# Patient Record
Sex: MALE | Race: WHITE | NOT HISPANIC OR LATINO | Employment: UNEMPLOYED | URBAN - METROPOLITAN AREA
[De-identification: names, ages, dates, MRNs, and addresses within clinical notes are randomized per-mention and may not be internally consistent; named-entity substitution may affect disease eponyms.]

---
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
## 2019-03-09 ENCOUNTER — OFFICE VISIT (OUTPATIENT)
Dept: URGENT CARE | Facility: CLINIC | Age: 19
End: 2019-03-09
Payer: COMMERCIAL

## 2019-03-09 VITALS
RESPIRATION RATE: 16 BRPM | HEART RATE: 90 BPM | WEIGHT: 190 LBS | TEMPERATURE: 98.7 F | SYSTOLIC BLOOD PRESSURE: 127 MMHG | DIASTOLIC BLOOD PRESSURE: 78 MMHG | BODY MASS INDEX: 29.82 KG/M2 | HEIGHT: 67 IN | OXYGEN SATURATION: 99 %

## 2019-03-09 DIAGNOSIS — L30.9 HAND DERMATITIS: Primary | ICD-10-CM

## 2019-03-09 PROCEDURE — 99213 OFFICE O/P EST LOW 20 MIN: CPT | Performed by: PHYSICIAN ASSISTANT

## 2019-03-09 PROCEDURE — S9088 SERVICES PROVIDED IN URGENT: HCPCS | Performed by: PHYSICIAN ASSISTANT

## 2019-03-09 RX ORDER — METHYLPREDNISOLONE 4 MG/1
TABLET ORAL
Qty: 21 TABLET | Refills: 0 | Status: SHIPPED | OUTPATIENT
Start: 2019-03-09

## 2019-03-09 RX ORDER — TRIAMCINOLONE ACETONIDE 1 MG/G
CREAM TOPICAL 3 TIMES DAILY
Qty: 45 G | Refills: 0 | Status: SHIPPED | OUTPATIENT
Start: 2019-03-09

## 2019-03-09 NOTE — PROGRESS NOTES
[facility]    NAME: Cricket Howell is a 22 y o  male  : 2000    MRN: [id number]  DATE: 2019  TIME: 11:53 AM    Assessment and Plan   Hand dermatitis [L30 9]  1  Hand dermatitis  triamcinolone (KENALOG) 0 1 % cream    methylprednisolone (MEDROL) 4 mg tablet       Patient Instructions     Patient Instructions   Start medrol dose elizabeth  Use cream as directed  Wear gloves  To prevent contact with /solutions  If not improving over the next week follow up with PCP  Chief Complaint     Chief Complaint   Patient presents with    Rash     hand, x 2 weeks       History of Present Illness   78-year-old male here with rash on both hands  States that he has been using  and solutions at work  Has been irritating his skin  Skin is dry, itchy and also painful at times  Has been trying to apply moisturizer but it is not helping enough  Review of Systems   Review of Systems   Constitutional: Negative for activity change, appetite change, chills, diaphoresis, fatigue, fever and unexpected weight change  HENT: Negative for congestion, ear pain, hearing loss, sinus pressure, sneezing, sore throat and tinnitus  Eyes: Negative for photophobia, redness and visual disturbance  Respiratory: Negative for apnea, cough, chest tightness, shortness of breath, wheezing and stridor  Cardiovascular: Negative for chest pain, palpitations and leg swelling  Gastrointestinal: Negative for abdominal distention, abdominal pain, blood in stool, constipation, diarrhea, nausea and vomiting  Endocrine: Negative for cold intolerance, heat intolerance, polydipsia, polyphagia and polyuria  Genitourinary: Negative for difficulty urinating, dysuria, flank pain, hematuria and urgency  Musculoskeletal: Negative for arthralgias, back pain, gait problem, myalgias, neck pain and neck stiffness  Skin: Positive for rash (Dermatitis both hands)  Negative for wound     Neurological: Negative for dizziness, tremors, seizures, syncope, weakness, light-headedness, numbness and headaches  Hematological: Negative for adenopathy  Does not bruise/bleed easily  Psychiatric/Behavioral: Negative for agitation, behavioral problems, confusion and decreased concentration  The patient is not nervous/anxious  All other systems reviewed and are negative  Current Medications     Current Outpatient Medications:     methylprednisolone (MEDROL) 4 mg tablet, Medrol dosepak, take as directed, Disp: 21 tablet, Rfl: 0    triamcinolone (KENALOG) 0 1 % cream, Apply topically 3 (three) times a day, Disp: 45 g, Rfl: 0    Current Allergies     Allergies as of 03/09/2019 - Reviewed 03/09/2019   Allergen Reaction Noted    Penicillins Vomiting 11/20/2018          The following portions of the patient's history were reviewed and updated as appropriate: allergies, current medications, past family history, past medical history, past social history, past surgical history and problem list    History reviewed  No pertinent past medical history  History reviewed  No pertinent surgical history  History reviewed  No pertinent family history    Social History     Socioeconomic History    Marital status: Single     Spouse name: Not on file    Number of children: Not on file    Years of education: Not on file    Highest education level: Not on file   Occupational History    Not on file   Social Needs    Financial resource strain: Not on file    Food insecurity:     Worry: Not on file     Inability: Not on file    Transportation needs:     Medical: Not on file     Non-medical: Not on file   Tobacco Use    Smoking status: Not on file   Substance and Sexual Activity    Alcohol use: Not on file    Drug use: Not on file    Sexual activity: Not on file   Lifestyle    Physical activity:     Days per week: Not on file     Minutes per session: Not on file    Stress: Not on file   Relationships    Social connections:     Talks on phone: Not on file     Gets together: Not on file     Attends L[employer] service: Not on file     Active member of club or organization: Not on file     Attends meetings of clubs or organizations: Not on file     Relationship status: Not on file    Intimate partner violence:     Fear of current or ex partner: Not on file     Emotionally abused: Not on file     Physically abused: Not on file     Forced sexual activity: Not on file   Other Topics Concern    Not on file   Social History Narrative    Not on file     Medications have been verified  Objective   /78   Pulse 90   Temp 98 7 °F (37 1 °C)   Resp 16   Ht 5' 7" (1 702 m)   Wt 86 2 kg (190 lb)   SpO2 99%   BMI 29 76 kg/m²      Physical Exam   Physical Exam   Constitutional: He appears well-developed and well-nourished  No distress  HENT:   Head: Normocephalic  Right Ear: External ear normal    Left Ear: External ear normal    Nose: Nose normal    Mouth/Throat: Oropharynx is clear and moist  No oropharyngeal exudate  Neck: Normal range of motion  Neck supple  Cardiovascular: Normal rate, regular rhythm and normal heart sounds  No murmur heard  Pulmonary/Chest: Effort normal and breath sounds normal  No respiratory distress  He has no wheezes  He has no rales  Abdominal: Soft  Bowel sounds are normal  There is no tenderness  Musculoskeletal: Normal range of motion  Lymphadenopathy:     He has no cervical adenopathy  Skin: Skin is warm  Rash (Erythematous dry rash bilateral palmar surface of each hand ) noted  Nursing note and vitals reviewed

## 2019-03-09 NOTE — PATIENT INSTRUCTIONS
Start medrol dose elizabeth  Use cream as directed  Wear gloves  To prevent contact with /solutions  If not improving over the next week follow up with PCP

## 2021-05-06 ENCOUNTER — OFFICE VISIT (OUTPATIENT)
Dept: FAMILY MEDICINE CLINIC | Facility: CLINIC | Age: 21
End: 2021-05-06
Payer: COMMERCIAL

## 2021-05-06 VITALS
OXYGEN SATURATION: 98 % | TEMPERATURE: 97.4 F | HEIGHT: 67 IN | DIASTOLIC BLOOD PRESSURE: 86 MMHG | BODY MASS INDEX: 31.55 KG/M2 | WEIGHT: 201 LBS | RESPIRATION RATE: 18 BRPM | HEART RATE: 96 BPM | SYSTOLIC BLOOD PRESSURE: 118 MMHG

## 2021-05-06 DIAGNOSIS — Z00.00 ANNUAL PHYSICAL EXAM: Primary | ICD-10-CM

## 2021-05-06 DIAGNOSIS — F17.200 TOBACCO USE DISORDER: ICD-10-CM

## 2021-05-06 DIAGNOSIS — F31.9 BIPOLAR 1 DISORDER (HCC): ICD-10-CM

## 2021-05-06 DIAGNOSIS — L30.8 OTHER ECZEMA: ICD-10-CM

## 2021-05-06 PROBLEM — L30.9 ECZEMA: Status: ACTIVE | Noted: 2021-05-06

## 2021-05-06 PROCEDURE — 99385 PREV VISIT NEW AGE 18-39: CPT | Performed by: PHYSICIAN ASSISTANT

## 2021-05-06 RX ORDER — HYDROXYZINE HYDROCHLORIDE 10 MG/1
10 TABLET, FILM COATED ORAL 3 TIMES DAILY PRN
COMMUNITY
Start: 2021-04-07

## 2021-05-06 RX ORDER — QUETIAPINE FUMARATE 400 MG/1
TABLET, FILM COATED ORAL
COMMUNITY
Start: 2021-05-03

## 2021-05-06 NOTE — PATIENT INSTRUCTIONS

## 2021-05-06 NOTE — PROGRESS NOTES
[facility]    NAME: Elvira Bang  AGE: 23 y o  SEX: male  : 2000     DATE: 2021     Assessment and Plan:     Problem List Items Addressed This Visit        Musculoskeletal and Integument    Eczema  Patient will call when needing refill of topical steroid       Other    Bipolar 1 disorder ([address] )  Managed by Psychiatry  Currently on Seroquel    Relevant Medications    QUEtiapine (SEROquel) 400 MG tablet    hydrOXYzine HCL (ATARAX) 10 mg tablet      Other Visit Diagnoses     Annual physical exam    -  Primary  Pt is a 23 y o  male  Vaccines: UTD, scheduled COVID vaccine in 2 days  Sexual Health: not sexually active  Family history: Mother - MI age 38 y/o, prior HTN, little known father side  - Labs: per orders    Relevant Orders    HIV 1/2 Antigen/Antibody (4th Generation) w Reflex [facility]    TSH, 3rd generation with Free T4 reflex    CBC and Platelet    Comprehensive metabolic panel    Lipid Panel with Direct LDL reflex    BMI 31 0-31 9,adult            Tobacco use disorder  Pt is in the Precontemplation stage (ie, not wanting to quit)  - Educate on the negative effects of Tobacco   - Recommend quitting smoking  - Listed cessation options           Immunizations and preventive care screenings were discussed with patient today  Appropriate education was printed on patient's after visit summary  Counseling:  Alcohol/drug use: discussed moderation in alcohol intake, the recommendations for healthy alcohol use, and avoidance of illicit drug use  Dental Health: discussed importance of regular tooth brushing, flossing, and dental visits  Sexual health: discussed sexually transmitted diseases, partner selection, use of condoms, avoidance of unintended pregnancy, and contraceptive alternatives  · Exercise: the importance of regular exercise/physical activity was discussed  Recommend exercise 3-5 times per week for at least 30 minutes  Tobacco Cessation Counseling: Tobacco cessation counseling was provided  The patient is sincerely urged to quit consumption of tobacco  He is ready to quit tobacco        Return in 1 year (on 5/6/2022)  Chief Complaint:     Chief Complaint   Patient presents with    SLP Initial Evaluation      History of Present Illness:     Adult Annual Physical   Patient here for a comprehensive physical exam  The patient reports no problems  Requesting labwork as recommended by psychiatry  Psychiatry thru [id number] [facility], for the past 2 months  Bipolar using Seroquel 400 mg    Looking for a therapist     Work - Jennifer at [employer],     Lives with cousin Hayley Arshad, his only family    Prior PCP prescribed Triamcinolone 0 1% cream for patchy eczema on face, ears    Diet and Physical Activity  · Diet/Nutrition: poor diet, frequent junk food and limited fruits/vegetables  · Exercise: walking and 5-7 times a week on average  Walks 30-60 minutes a day  Does not drive     Depression Screening  PHQ-9 Depression Screening    PHQ-9:   Frequency of the following problems over the past two weeks:      Little interest or pleasure in doing things: 0 - not at all  Feeling down, depressed, or hopeless: 1 - several days  PHQ-2 Score: 1       General Health  · Sleep: sleeps poorly  Uses Seroquel which has helped  · Hearing: normal - bilateral   · Vision: no vision problems  · Dental: regular dental visits and brushes teeth twice daily   Health  · History of STDs?: no      Review of Systems:     Review of Systems   Constitutional: Negative for activity change, chills, fatigue, fever and unexpected weight change  Respiratory: Negative for cough, shortness of breath and wheezing  Cardiovascular: Negative for chest pain, palpitations and leg swelling  Gastrointestinal: Negative for constipation, diarrhea, nausea and vomiting          Heartburn 2-3 times a week based on diet   Musculoskeletal: Negative for back pain, neck pain and neck stiffness  Allergic/Immunologic: Positive for environmental allergies (occasional, eye/ear itchiness)  Negative for food allergies  Neurological: Negative for dizziness, weakness and headaches  Psychiatric/Behavioral: Positive for sleep disturbance  Negative for dysphoric mood  The patient is nervous/anxious  Past Medical History:     History reviewed  No pertinent past medical history  Past Surgical History:     History reviewed  No pertinent surgical history     Social History:     E-Cigarette/Vaping    E-Cigarette Use Current Every Day User      E-Cigarette/Vaping Substances    Nicotine Yes     Flavoring Yes      Social History     Socioeconomic History    Marital status: Single     Spouse name: None    Number of children: None    Years of education: None    Highest education level: None   Occupational History    None   Social Needs    Financial resource strain: None    Food insecurity     Worry: None     Inability: None    Transportation needs     Medical: None     Non-medical: None   Tobacco Use    Smoking status: Current Some Day Smoker     Packs/day: 0 50     Years: 8 00     Pack years: 4 00     Types: Cigarettes    Smokeless tobacco: Never Used   Substance and Sexual Activity    Alcohol use: Not Currently    Drug use: Never    Sexual activity: Not Currently   Lifestyle    Physical activity     Days per week: None     Minutes per session: None    Stress: None   Relationships    Social connections     Talks on phone: None     Gets together: None     Attends [employer] service: None     Active member of club or organization: None     Attends meetings of clubs or organizations: None     Relationship status: None    Intimate partner violence     Fear of current or ex partner: None     Emotionally abused: None     Physically abused: None     Forced sexual activity: None   Other Topics Concern    None   Social History Narrative    None      Family History:     Family History   Problem Relation Age of Onset    Heart disease Mother     Bipolar disorder Sister     Substance Abuse Sister     Stroke Paternal Grandmother       Current Medications:     Current Outpatient Medications   Medication Sig Dispense Refill    hydrOXYzine HCL (ATARAX) 10 mg tablet Take 10 mg by mouth 3 (three) times a day as needed      QUEtiapine (SEROquel) 400 MG tablet       triamcinolone (KENALOG) 0 1 % cream Apply topically 3 (three) times a day 45 g 0    methylprednisolone (MEDROL) 4 mg tablet Medrol dosepak, take as directed (Patient not taking: Reported on 5/6/2021) 21 tablet 0     No current facility-administered medications for this visit  Allergies: Allergies   Allergen Reactions    Penicillins Vomiting      Physical Exam:     /86   Pulse 96   Temp (!) 97 4 °F (36 3 °C)   Resp 18   Ht 5' 7" (1 702 m)   Wt 91 2 kg (201 lb)   SpO2 98%   BMI 31 48 kg/m²     Physical Exam  Constitutional:       Appearance: He is well-developed  He is obese  HENT:      Head: Normocephalic and atraumatic  Mouth/Throat:      Pharynx: No oropharyngeal exudate  Eyes:      Pupils: Pupils are equal, round, and reactive to light  Neck:      Musculoskeletal: Normal range of motion and neck supple  Thyroid: No thyromegaly  Cardiovascular:      Rate and Rhythm: Normal rate and regular rhythm  Heart sounds: Normal heart sounds  No murmur  Pulmonary:      Effort: Pulmonary effort is normal  No respiratory distress  Breath sounds: Normal breath sounds  No wheezing  Abdominal:      General: Bowel sounds are normal  There is no distension  Palpations: Abdomen is soft  Tenderness: There is no abdominal tenderness  Hernia: No hernia is present  Musculoskeletal: Normal range of motion  Lymphadenopathy:      Cervical: No cervical adenopathy  Skin:     General: Skin is warm     Neurological:      Mental Status: He is alert and oriented to person, place, and time  Psychiatric:         Behavior: Behavior normal          Thought Content:  Thought content normal           Rachael Valencia PA-C   [id number]

## 2021-06-29 LAB
ALBUMIN SERPL-MCNC: 4.5 G/DL (ref 3.6–5.1)
ALBUMIN/GLOB SERPL: 1.9 (CALC) (ref 1–2.5)
ALP SERPL-CCNC: 82 U/L (ref 36–130)
ALT SERPL-CCNC: 19 U/L (ref 9–46)
AST SERPL-CCNC: 18 U/L (ref 10–40)
BASOPHILS # BLD AUTO: 30 CELLS/UL (ref 0–200)
BASOPHILS NFR BLD AUTO: 0.6 %
BILIRUB SERPL-MCNC: 0.4 MG/DL (ref 0.2–1.2)
BUN SERPL-MCNC: 21 MG/DL (ref 7–25)
BUN/CREAT SERPL: NORMAL (CALC) (ref 6–22)
CALCIUM SERPL-MCNC: 9.8 MG/DL (ref 8.6–10.3)
CHLORIDE SERPL-SCNC: 101 MMOL/L (ref 98–110)
CHOLEST SERPL-MCNC: 296 MG/DL
CHOLEST/HDLC SERPL: 9.3 (CALC)
CO2 SERPL-SCNC: 24 MMOL/L (ref 20–32)
CREAT SERPL-MCNC: 0.99 MG/DL (ref 0.6–1.35)
EOSINOPHIL # BLD AUTO: 180 CELLS/UL (ref 15–500)
EOSINOPHIL NFR BLD AUTO: 3.6 %
ERYTHROCYTE [DISTWIDTH] IN BLOOD BY AUTOMATED COUNT: 13.3 % (ref 11–15)
GLOBULIN SER CALC-MCNC: 2.4 G/DL (CALC) (ref 1.9–3.7)
GLUCOSE SERPL-MCNC: 95 MG/DL (ref 65–99)
HCT VFR BLD AUTO: 46.6 % (ref 38.5–50)
HDLC SERPL-MCNC: 32 MG/DL
HGB BLD-MCNC: 15.6 G/DL (ref 13.2–17.1)
HIV 1+2 AB+HIV1 P24 AG SERPL QL IA: NORMAL
LDLC SERPL DIRECT ASSAY-MCNC: 155 MG/DL
LYMPHOCYTES # BLD AUTO: 2600 CELLS/UL (ref 850–3900)
LYMPHOCYTES NFR BLD AUTO: 52 %
MCH RBC QN AUTO: 30 PG (ref 27–33)
MCHC RBC AUTO-ENTMCNC: 33.5 G/DL (ref 32–36)
MCV RBC AUTO: 89.6 FL (ref 80–100)
MONOCYTES # BLD AUTO: 460 CELLS/UL (ref 200–950)
MONOCYTES NFR BLD AUTO: 9.2 %
NEUTROPHILS # BLD AUTO: 1730 CELLS/UL (ref 1500–7800)
NEUTROPHILS NFR BLD AUTO: 34.6 %
NONHDLC SERPL-MCNC: 264 MG/DL (CALC)
PLATELET # BLD AUTO: 249 THOUSAND/UL (ref 140–400)
PMV BLD REES-ECKER: 9.3 FL (ref 7.5–12.5)
POTASSIUM SERPL-SCNC: 4.4 MMOL/L (ref 3.5–5.3)
PROT SERPL-MCNC: 6.9 G/DL (ref 6.1–8.1)
RBC # BLD AUTO: 5.2 MILLION/UL (ref 4.2–5.8)
SL AMB EGFR AFRICAN AMERICAN: 126 ML/MIN/1.73M2
SL AMB EGFR NON AFRICAN AMERICAN: 108 ML/MIN/1.73M2
SODIUM SERPL-SCNC: 137 MMOL/L (ref 135–146)
TRIGL SERPL-MCNC: 532 MG/DL
TSH SERPL-ACNC: 0.66 MIU/L (ref 0.4–4.5)
WBC # BLD AUTO: 5 THOUSAND/UL (ref 3.8–10.8)

## 2021-09-02 ENCOUNTER — OFFICE VISIT (OUTPATIENT)
Dept: FAMILY MEDICINE CLINIC | Facility: CLINIC | Age: 21
End: 2021-09-02
Payer: COMMERCIAL

## 2021-09-02 VITALS
DIASTOLIC BLOOD PRESSURE: 68 MMHG | RESPIRATION RATE: 18 BRPM | HEART RATE: 76 BPM | TEMPERATURE: 98.4 F | OXYGEN SATURATION: 98 % | HEIGHT: 67 IN | SYSTOLIC BLOOD PRESSURE: 108 MMHG | WEIGHT: 214 LBS | BODY MASS INDEX: 33.59 KG/M2

## 2021-09-02 DIAGNOSIS — F17.200 TOBACCO USE DISORDER: ICD-10-CM

## 2021-09-02 DIAGNOSIS — F31.9 BIPOLAR 1 DISORDER (HCC): ICD-10-CM

## 2021-09-02 DIAGNOSIS — E78.2 MIXED HYPERLIPIDEMIA: Primary | ICD-10-CM

## 2021-09-02 PROCEDURE — 99213 OFFICE O/P EST LOW 20 MIN: CPT | Performed by: PHYSICIAN ASSISTANT

## 2021-09-02 RX ORDER — HYDROXYZINE 50 MG/1
50 TABLET, FILM COATED ORAL
COMMUNITY
Start: 2021-08-05

## 2021-09-02 RX ORDER — QUETIAPINE FUMARATE 200 MG/1
200 TABLET, FILM COATED ORAL EVERY EVENING
COMMUNITY
Start: 2021-08-23

## 2021-09-02 NOTE — PROGRESS NOTES
Assessment/Plan:    Mixed hyperlipidemia  Lab Results   Component Value Date    CHOLESTEROL 296 (H) 06/28/2021     Lab Results   Component Value Date    HDL 32 (L) 06/28/2021     Lab Results   Component Value Date    TRIG 532 (H) 06/28/2021   Discussed possible famailial etiology vs Seroquel  Encouraged continued healthy diet change  Repeat FLP  Discussed medication options  Psychiatry is trying to change off Seroquel for possible improvement  Tobacco use disorder  Continued discussion  Not wanting to stop vaping    Wt Readings from Last 3 Encounters:   09/02/21 97 1 kg (214 lb)   05/06/21 91 2 kg (201 lb)   03/09/19 86 2 kg (190 lb) (89 %, Z= 1 20)*     * Growth percentiles are based on CDC (Boys, 2-20 Years) data  Continues to gain weight  Will call with results of labwork         Subjective:    Patient ID: Merari Carson is a 23 y o  male  Pt is presenting today for Follow up of high cholesterol  His insurance is changing later this month and wants to get repeat labs before change  He has tried to manage it with eating less fast food and trying to eat more high fiber foods  Started working at [employer] and has been lifting often  Getting more exercise  Discussed with Psychiatry, Kim Mckee about his Seroquel causing elevated cholesterol  They are having him try a new medication (unknown)  The following portions of the patient's history were reviewed and updated as appropriate: allergies, current medications and problem list     Review of Systems   Constitutional: Negative for activity change, fatigue, fever and unexpected weight change (states it may be more muscle from lifting)  HENT: Negative for rhinorrhea and sore throat  Respiratory: Negative for cough, shortness of breath and wheezing  Cardiovascular: Negative for chest pain, palpitations and leg swelling  Gastrointestinal: Negative for constipation, diarrhea, nausea and vomiting     Musculoskeletal: Negative for back pain, neck pain and neck stiffness  Skin: Negative for rash  Psychiatric/Behavioral: Positive for dysphoric mood  The patient is nervous/anxious  Objective:  /68 (BP Location: Left arm, Patient Position: Sitting, Cuff Size: Large)   Pulse 76   Temp 98 4 °F (36 9 °C)   Resp 18   Ht 5' 7" (1 702 m)   Wt 97 1 kg (214 lb)   SpO2 98%   BMI 33 52 kg/m²      Physical Exam  Constitutional:       General: He is not in acute distress  Appearance: He is well-developed  He is obese  HENT:      Head: Normocephalic and atraumatic  Pulmonary:      Effort: Pulmonary effort is normal  No respiratory distress  Breath sounds: No wheezing  Neurological:      Mental Status: He is alert  Psychiatric:         Behavior: Behavior normal          Thought Content:  Thought content normal

## 2021-09-02 NOTE — ASSESSMENT & PLAN NOTE
Lab Results   Component Value Date    CHOLESTEROL 296 (H) 06/28/2021     Lab Results   Component Value Date    HDL 32 (L) 06/28/2021     Lab Results   Component Value Date    TRIG 532 (H) 06/28/2021   Discussed possible famailial etiology vs Seroquel  Encouraged continued healthy diet change  Repeat FLP  Discussed medication options  Psychiatry is trying to change off Seroquel for possible improvement

## 2023-07-26 ENCOUNTER — HOSPITAL ENCOUNTER (EMERGENCY)
Facility: HOSPITAL | Age: 23
End: 2023-07-27
Attending: EMERGENCY MEDICINE
Payer: COMMERCIAL

## 2023-07-26 VITALS
HEART RATE: 104 BPM | DIASTOLIC BLOOD PRESSURE: 84 MMHG | RESPIRATION RATE: 18 BRPM | OXYGEN SATURATION: 98 % | TEMPERATURE: 98.4 F | SYSTOLIC BLOOD PRESSURE: 125 MMHG

## 2023-07-26 DIAGNOSIS — E78.2 MIXED HYPERLIPIDEMIA: Primary | ICD-10-CM

## 2023-07-26 LAB
AMPHETAMINES SERPL QL SCN: NEGATIVE
BARBITURATES UR QL: NEGATIVE
BENZODIAZ UR QL: NEGATIVE
COCAINE UR QL: NEGATIVE
ETHANOL EXG-MCNC: 0 MG/DL
METHADONE UR QL: NEGATIVE
OPIATES UR QL SCN: NEGATIVE
OXYCODONE+OXYMORPHONE UR QL SCN: NEGATIVE
PCP UR QL: NEGATIVE
THC UR QL: POSITIVE

## 2023-07-26 PROCEDURE — 80307 DRUG TEST PRSMV CHEM ANLYZR: CPT | Performed by: EMERGENCY MEDICINE

## 2023-07-26 PROCEDURE — 82075 ASSAY OF BREATH ETHANOL: CPT | Performed by: EMERGENCY MEDICINE

## 2023-07-26 PROCEDURE — 99284 EMERGENCY DEPT VISIT MOD MDM: CPT

## 2023-07-26 RX ORDER — ALPRAZOLAM 0.25 MG/1
0.25 TABLET ORAL ONCE
Status: DISCONTINUED | OUTPATIENT
Start: 2023-07-26 | End: 2023-07-27 | Stop reason: HOSPADM

## 2023-07-26 NOTE — LETTER
[address] [address]  Dept: [phone]      EMTALA TRANSFER CONSENT    NAME Melvin Mon                                         2000                              MRN [id number]    I have been informed of my rights regarding examination, treatment, and transfer   by Dr. Sagrario Cabral MD    Benefits: Specialized equipment and/or services available at the receiving facility (Include comment)________________________, Continuity of care, Other benefits (Include comment)_______________________ (inpatient mental health 201)    Risks: Potential deterioration of medical condition, Potential for delay in receiving treatment, Increased discomfort during transfer, Possible worsening of condition or death during transfer      Consent for Transfer:  I acknowledge that my medical condition has been evaluated and explained to me by the emergency department physician or other qualified medical person and/or my attending physician, who has recommended that I be transferred to the service of  Accepting Physician: Dr. Anjelica Gomes at [address] [place] [address] [address] Name, [facility] : [place], [place]. The above potential benefits of such transfer, the potential risks associated with such transfer, and the probable risks of not being transferred have been explained to me, and I fully understand them. The doctor has explained that, in my case, the benefits of transfer outweigh the risks. I agree to be transferred. I authorize the performance of emergency medical procedures and treatments upon me in both transit and upon arrival at the receiving facility. Additionally, I authorize the release of any and all medical records to the receiving facility and request they be transported with me, if possible.   I understand that the safest mode of transportation during a medical emergency is an ambulance and that the [facility] advocates the use of this mode of transport. Risks of traveling to the receiving facility by car, including absence of medical control, life sustaining equipment, such as oxygen, and medical personnel has been explained to me and I fully understand them. (TANMAY CORRECT BOX BELOW)  [  ]  I consent to the stated transfer and to be transported by ambulance/helicopter. [  ]  I consent to the stated transfer, but refuse transportation by ambulance and accept full responsibility for my transportation by car. I understand the risks of non-ambulance transfers and I exonerate the Hospital and its staff from any deterioration in my condition that results from this refusal.    X___________________________________________    DATE  23  TIME________  Signature of patient or legally responsible individual signing on patient behalf           RELATIONSHIP TO PATIENT_________________________          Provider Certification    NAME Pablito Bran                                        [facility] 2000                              MRN [id number]    A medical screening exam was performed on the above named patient. Based on the examination:    Condition Necessitating Transfer The encounter diagnosis was Mixed hyperlipidemia.     Patient Condition: The patient has been stabilized such that within reasonable medical probability, no material deterioration of the patient condition or the condition of the unborn child(elli) is likely to result from the transfer    Reason for Transfer: Level of Care needed not available at this facility, No bed available at level of patient's needs, Other (Include comment)____________________ (inpatient mental health 201)    Transfer Requirements: [facility], [address] [address] available and qualified personnel available for treatment as acknowledged by Lara Reynolds.; [phone]  Agreed to accept transfer and to provide appropriate medical treatment as acknowledged by        Dheeraj Machado  Appropriate medical records of the examination and treatment of the patient are provided at the time of transfer   [address] _______  Transfer will be performed by qualified personnel from [address]  and appropriate transfer equipment as required, including the use of necessary and appropriate life support measures. Provider Certification: I have examined the patient and explained the following risks and benefits of being transferred/refusing transfer to the patient/family:  General risk, such as traffic hazards, adverse weather conditions, rough terrain or turbulence, possible failure of equipment (including vehicle or aircraft), or consequences of actions of persons outside the control of the transport personnel, Unanticipated needs of medical equipment and personnel during transport, Risk of worsening condition, The possibility of a transport vehicle being unavailable, The patient is stable for psychiatric transfer because they are medically stable, and is protected from harming him/herself or others during transport      Based on these reasonable risks and benefits to the patient and/or the unborn child(elli), and based upon the information available at the time of the patient’s examination, I certify that the medical benefits reasonably to be expected from the provision of appropriate medical treatments at another medical facility outweigh the increasing risks, if any, to the individual’s medical condition, and in the case of labor to the unborn child, from effecting the transfer.     X____________________________________________ DATE 07/27/23        TIME_______      ORIGINAL - SEND TO MEDICAL RECORDS   COPY - SEND WITH PATIENT DURING TRANSFER

## 2023-07-27 ENCOUNTER — HOSPITAL ENCOUNTER (INPATIENT)
Facility: HOSPITAL | Age: 23
LOS: 4 days | Discharge: HOME | DRG: 885 | End: 2023-07-31
Attending: STUDENT IN AN ORGANIZED HEALTH CARE EDUCATION/TRAINING PROGRAM | Admitting: PSYCHIATRY & NEUROLOGY
Payer: COMMERCIAL

## 2023-07-27 DIAGNOSIS — F39 MOOD DISORDER (HCC): Primary | ICD-10-CM

## 2023-07-27 DIAGNOSIS — E78.2 MIXED HYPERLIPIDEMIA: ICD-10-CM

## 2023-07-27 RX ORDER — AMOXICILLIN 250 MG
1 CAPSULE ORAL DAILY PRN
Status: CANCELLED | OUTPATIENT
Start: 2023-07-27

## 2023-07-27 RX ORDER — HYDROXYZINE HYDROCHLORIDE 25 MG/1
25 TABLET, FILM COATED ORAL
Status: CANCELLED | OUTPATIENT
Start: 2023-07-27

## 2023-07-27 RX ORDER — DIPHENHYDRAMINE HYDROCHLORIDE 50 MG/ML
50 INJECTION INTRAMUSCULAR; INTRAVENOUS EVERY 6 HOURS PRN
Status: CANCELLED | OUTPATIENT
Start: 2023-07-27

## 2023-07-27 RX ORDER — BENZTROPINE MESYLATE 1 MG/1
1 TABLET ORAL
Status: DISCONTINUED | OUTPATIENT
Start: 2023-07-27 | End: 2023-07-31 | Stop reason: HOSPADM

## 2023-07-27 RX ORDER — HALOPERIDOL 1 MG/1
1 TABLET ORAL EVERY 6 HOURS PRN
Status: CANCELLED | OUTPATIENT
Start: 2023-07-27

## 2023-07-27 RX ORDER — HYDROXYZINE HYDROCHLORIDE 25 MG/1
100 TABLET, FILM COATED ORAL
Status: CANCELLED | OUTPATIENT
Start: 2023-07-27

## 2023-07-27 RX ORDER — POLYETHYLENE GLYCOL 3350 17 G/17G
17 POWDER, FOR SOLUTION ORAL DAILY PRN
Status: CANCELLED | OUTPATIENT
Start: 2023-07-27

## 2023-07-27 RX ORDER — HYDROXYZINE 50 MG/1
100 TABLET, FILM COATED ORAL
Status: DISCONTINUED | OUTPATIENT
Start: 2023-07-27 | End: 2023-07-31 | Stop reason: HOSPADM

## 2023-07-27 RX ORDER — BISACODYL 10 MG
10 SUPPOSITORY, RECTAL RECTAL DAILY PRN
Status: CANCELLED | OUTPATIENT
Start: 2023-07-27

## 2023-07-27 RX ORDER — BENZTROPINE MESYLATE 1 MG/ML
0.5 INJECTION INTRAMUSCULAR; INTRAVENOUS
Status: CANCELLED | OUTPATIENT
Start: 2023-07-27

## 2023-07-27 RX ORDER — HALOPERIDOL 5 MG/ML
2.5 INJECTION INTRAMUSCULAR
Status: DISCONTINUED | OUTPATIENT
Start: 2023-07-27 | End: 2023-07-31 | Stop reason: HOSPADM

## 2023-07-27 RX ORDER — BENZTROPINE MESYLATE 1 MG/ML
1 INJECTION INTRAMUSCULAR; INTRAVENOUS
Status: CANCELLED | OUTPATIENT
Start: 2023-07-27

## 2023-07-27 RX ORDER — LANOLIN ALCOHOL/MO/W.PET/CERES
3 CREAM (GRAM) TOPICAL
Status: CANCELLED | OUTPATIENT
Start: 2023-07-27

## 2023-07-27 RX ORDER — HALOPERIDOL 5 MG/1
5 TABLET ORAL
Status: CANCELLED | OUTPATIENT
Start: 2023-07-27

## 2023-07-27 RX ORDER — ACETAMINOPHEN 325 MG/1
650 TABLET ORAL EVERY 4 HOURS PRN
Status: CANCELLED | OUTPATIENT
Start: 2023-07-27

## 2023-07-27 RX ORDER — ACETAMINOPHEN 325 MG/1
650 TABLET ORAL EVERY 6 HOURS PRN
Status: DISCONTINUED | OUTPATIENT
Start: 2023-07-27 | End: 2023-07-31 | Stop reason: HOSPADM

## 2023-07-27 RX ORDER — MAGNESIUM HYDROXIDE/ALUMINUM HYDROXICE/SIMETHICONE 120; 1200; 1200 MG/30ML; MG/30ML; MG/30ML
30 SUSPENSION ORAL EVERY 4 HOURS PRN
Status: CANCELLED | OUTPATIENT
Start: 2023-07-27

## 2023-07-27 RX ORDER — HALOPERIDOL 5 MG/ML
5 INJECTION INTRAMUSCULAR
Status: CANCELLED | OUTPATIENT
Start: 2023-07-27

## 2023-07-27 RX ORDER — HALOPERIDOL 1 MG/1
1 TABLET ORAL EVERY 6 HOURS PRN
Status: DISCONTINUED | OUTPATIENT
Start: 2023-07-27 | End: 2023-07-31 | Stop reason: HOSPADM

## 2023-07-27 RX ORDER — HYDROXYZINE HYDROCHLORIDE 25 MG/1
25 TABLET, FILM COATED ORAL
Status: DISCONTINUED | OUTPATIENT
Start: 2023-07-27 | End: 2023-07-31 | Stop reason: HOSPADM

## 2023-07-27 RX ORDER — HALOPERIDOL 5 MG/ML
5 INJECTION INTRAMUSCULAR
Status: DISCONTINUED | OUTPATIENT
Start: 2023-07-27 | End: 2023-07-31 | Stop reason: HOSPADM

## 2023-07-27 RX ORDER — QUETIAPINE FUMARATE 200 MG/1
200 TABLET, FILM COATED ORAL EVERY EVENING
Status: CANCELLED | OUTPATIENT
Start: 2023-07-27

## 2023-07-27 RX ORDER — BENZTROPINE MESYLATE 1 MG/ML
0.5 INJECTION INTRAMUSCULAR; INTRAVENOUS
Status: DISCONTINUED | OUTPATIENT
Start: 2023-07-27 | End: 2023-07-31 | Stop reason: HOSPADM

## 2023-07-27 RX ORDER — LORAZEPAM 2 MG/ML
1 INJECTION INTRAMUSCULAR
Status: DISCONTINUED | OUTPATIENT
Start: 2023-07-27 | End: 2023-07-31 | Stop reason: HOSPADM

## 2023-07-27 RX ORDER — DIPHENHYDRAMINE HYDROCHLORIDE 50 MG/ML
50 INJECTION INTRAMUSCULAR; INTRAVENOUS EVERY 6 HOURS PRN
Status: DISCONTINUED | OUTPATIENT
Start: 2023-07-27 | End: 2023-07-31 | Stop reason: HOSPADM

## 2023-07-27 RX ORDER — LANOLIN ALCOHOL/MO/W.PET/CERES
3 CREAM (GRAM) TOPICAL
Status: DISCONTINUED | OUTPATIENT
Start: 2023-07-27 | End: 2023-07-28

## 2023-07-27 RX ORDER — LORAZEPAM 2 MG/ML
2 INJECTION INTRAMUSCULAR
Status: CANCELLED | OUTPATIENT
Start: 2023-07-27

## 2023-07-27 RX ORDER — MAGNESIUM HYDROXIDE/ALUMINUM HYDROXICE/SIMETHICONE 120; 1200; 1200 MG/30ML; MG/30ML; MG/30ML
30 SUSPENSION ORAL EVERY 4 HOURS PRN
Status: DISCONTINUED | OUTPATIENT
Start: 2023-07-27 | End: 2023-07-31 | Stop reason: HOSPADM

## 2023-07-27 RX ORDER — LORAZEPAM 2 MG/ML
2 INJECTION INTRAMUSCULAR EVERY 6 HOURS PRN
Status: DISCONTINUED | OUTPATIENT
Start: 2023-07-27 | End: 2023-07-31 | Stop reason: HOSPADM

## 2023-07-27 RX ORDER — BISACODYL 10 MG
10 SUPPOSITORY, RECTAL RECTAL DAILY PRN
Status: DISCONTINUED | OUTPATIENT
Start: 2023-07-27 | End: 2023-07-28

## 2023-07-27 RX ORDER — HALOPERIDOL 5 MG/ML
2.5 INJECTION INTRAMUSCULAR
Status: CANCELLED | OUTPATIENT
Start: 2023-07-27

## 2023-07-27 RX ORDER — HYDROXYZINE 50 MG/1
50 TABLET, FILM COATED ORAL
Status: DISCONTINUED | OUTPATIENT
Start: 2023-07-27 | End: 2023-07-31 | Stop reason: HOSPADM

## 2023-07-27 RX ORDER — HYDROXYZINE HYDROCHLORIDE 25 MG/1
50 TABLET, FILM COATED ORAL
Status: CANCELLED | OUTPATIENT
Start: 2023-07-27

## 2023-07-27 RX ORDER — BENZTROPINE MESYLATE 0.5 MG/1
1 TABLET ORAL
Status: CANCELLED | OUTPATIENT
Start: 2023-07-27

## 2023-07-27 RX ORDER — ACETAMINOPHEN 325 MG/1
650 TABLET ORAL EVERY 6 HOURS PRN
Status: CANCELLED | OUTPATIENT
Start: 2023-07-27

## 2023-07-27 RX ORDER — TRAZODONE HYDROCHLORIDE 50 MG/1
50 TABLET ORAL
Status: CANCELLED | OUTPATIENT
Start: 2023-07-27

## 2023-07-27 RX ORDER — LORAZEPAM 2 MG/ML
1 INJECTION INTRAMUSCULAR
Status: CANCELLED | OUTPATIENT
Start: 2023-07-27

## 2023-07-27 RX ORDER — LORAZEPAM 2 MG/ML
2 INJECTION INTRAMUSCULAR EVERY 6 HOURS PRN
Status: CANCELLED | OUTPATIENT
Start: 2023-07-27

## 2023-07-27 RX ORDER — QUETIAPINE FUMARATE 200 MG/1
200 TABLET, FILM COATED ORAL EVERY EVENING
Status: DISCONTINUED | OUTPATIENT
Start: 2023-07-27 | End: 2023-07-28

## 2023-07-27 RX ORDER — ACETAMINOPHEN 325 MG/1
975 TABLET ORAL EVERY 6 HOURS PRN
Status: CANCELLED | OUTPATIENT
Start: 2023-07-27

## 2023-07-27 RX ORDER — HALOPERIDOL 1 MG/1
2.5 TABLET ORAL
Status: CANCELLED | OUTPATIENT
Start: 2023-07-27

## 2023-07-27 RX ORDER — LORAZEPAM 2 MG/ML
2 INJECTION INTRAMUSCULAR
Status: DISCONTINUED | OUTPATIENT
Start: 2023-07-27 | End: 2023-07-31 | Stop reason: HOSPADM

## 2023-07-27 RX ORDER — POLYETHYLENE GLYCOL 3350 17 G/17G
17 POWDER, FOR SOLUTION ORAL DAILY PRN
Status: DISCONTINUED | OUTPATIENT
Start: 2023-07-27 | End: 2023-07-28

## 2023-07-27 RX ORDER — TRAZODONE HYDROCHLORIDE 50 MG/1
50 TABLET ORAL
Status: DISCONTINUED | OUTPATIENT
Start: 2023-07-27 | End: 2023-07-31 | Stop reason: HOSPADM

## 2023-07-27 RX ORDER — AMOXICILLIN 250 MG
1 CAPSULE ORAL DAILY PRN
Status: DISCONTINUED | OUTPATIENT
Start: 2023-07-27 | End: 2023-07-31 | Stop reason: HOSPADM

## 2023-07-27 RX ORDER — ACETAMINOPHEN 325 MG/1
650 TABLET ORAL EVERY 4 HOURS PRN
Status: DISCONTINUED | OUTPATIENT
Start: 2023-07-27 | End: 2023-07-31 | Stop reason: HOSPADM

## 2023-07-27 RX ORDER — BENZTROPINE MESYLATE 1 MG/ML
1 INJECTION INTRAMUSCULAR; INTRAVENOUS
Status: DISCONTINUED | OUTPATIENT
Start: 2023-07-27 | End: 2023-07-31 | Stop reason: HOSPADM

## 2023-07-27 RX ORDER — HALOPERIDOL 5 MG/1
5 TABLET ORAL
Status: DISCONTINUED | OUTPATIENT
Start: 2023-07-27 | End: 2023-07-31 | Stop reason: HOSPADM

## 2023-07-27 RX ORDER — HALOPERIDOL 5 MG/1
2.5 TABLET ORAL
Status: DISCONTINUED | OUTPATIENT
Start: 2023-07-27 | End: 2023-07-31 | Stop reason: HOSPADM

## 2023-07-27 RX ORDER — ACETAMINOPHEN 325 MG/1
975 TABLET ORAL EVERY 6 HOURS PRN
Status: DISCONTINUED | OUTPATIENT
Start: 2023-07-27 | End: 2023-07-31 | Stop reason: HOSPADM

## 2023-07-27 RX ADMIN — QUETIAPINE 200 MG: 200 TABLET ORAL at 17:23

## 2023-07-27 NOTE — ED CARE HANDOFF
Emergency Department Sign Out Note        Sign out and transfer of care from Dr. Rah Lawrence. See Separate Emergency Department note. The patient, Amara Rivera, was evaluated by the previous provider for anxiety, depression. Workup Completed:  Psychiatric clearance labs    ED Course / Workup Pending (followup): This is a 14-year-old male patient presenting for depression, anxiety, and after evaluation was medically cleared by previous provider. Patient was seen by crisis, signed a 201, and was signed out to me pending placement. Patient stay was unremarkable during my shift, patient signed out at shift change to day provider. Procedures  MDM        Disposition  Final diagnoses:   Mixed hyperlipidemia     Time reflects when diagnosis was documented in both MDM as applicable and the Disposition within this note     Time User Action Codes Description Comment    7/27/2023 12:04 AM Mona Gtz Add [E78.2] Mixed hyperlipidemia       ED Disposition     ED Disposition   Transfer to Behavioral Health Condition   --    Date/Time   Thu Jul 27, 2023 12:06 AM    Comment   Amara Rivera should be transferred out to inpatient psychiatry unit and has been medically cleared.            MD Documentation    Bridger Fay Most Recent Value   Patient Condition The patient has been stabilized such that within reasonable medical probability, no material deterioration of the patient condition or the condition of the unborn child(elli) is likely to result from the transfer   Reason for Transfer Level of Care needed not available at this facility, No bed available at level of patient's needs, Other (Include comment)____________________  [inpatient mental health 201]   Benefits of Transfer Specialized equipment and/or services available at the receiving facility (Include comment)________________________, Continuity of care, Other benefits (Include comment)_______________________  [facility] mental [facility] 201]   Risks of Transfer Potential deterioration of medical condition, Potential for delay in receiving treatment, Increased discomfort during transfer, Possible worsening of condition or death during transfer   Accepting Physician Dr. Noah Melendez Name, [address] [place], [place]    (Name & Tel number) Jim Lord.,  [phone]   Transported by (Company and Unit #) [facility]   Sending MD Dr. Checo Dinero   Provider Certification General risk, such as traffic hazards, adverse weather conditions, rough terrain or turbulence, possible failure of equipment (including vehicle or aircraft), or consequences of actions of persons outside the control of the transport personnel, Unanticipated needs of medical equipment and personnel during transport, Risk of worsening condition, The possibility of a transport vehicle being unavailable, The patient is stable for psychiatric transfer because they are medically stable, and is protected from harming him/herself or others during transport      RN Documentation    Flowsheet Row Most [facility] Name, [address] [place], [place]   Bed Assignment Per RN    (Name & Tel number) Jim Lord.,  [phone]   Report Given to RN to RN   Medications Reviewed with Next Provider of Service Yes   Transport Mode Other (Comment)  [CTS]   Transported by [employer] and Unit #) [facility]   Level of Care Other (Comment)  [CTS]   Copies of Medical Records Sent History and Physical, Orders, Progress note, Transfer form, Nursing note, Med Rec form, Labs, Other (comment)  [original 201]   Patient Belongings Disposition Sent with patient   Transfer Date 07/27/23   Transfer Time 1130      Follow-up Information    None       Patient's Medications   Discharge Prescriptions    No medications on file     No discharge procedures on file.        ED Provider  Electronically Signed by     Sagrario Cabral MD  07/27/23 0528

## 2023-07-27 NOTE — PLAN OF CARE
Problem: DEPRESSION  Goal: Will be euthymic at discharge  Description: INTERVENTIONS:  - Administer medication as ordered  - Provide emotional support via 1:1 interaction with staff  - Encourage involvement in milieu/groups/activities  - Monitor for social isolation  Outcome: Progressing     Problem: ANXIETY  Goal: Will report anxiety at manageable levels  Description: INTERVENTIONS:  - Administer medication as ordered  - Teach and encourage coping skills  - Provide emotional support  - Assess patient/family for anxiety and ability to cope  Outcome: Progressing  Goal: By discharge: Patient will verbalize 2 strategies to deal with anxiety  Description: Interventions:  - Identify any obvious source/trigger to anxiety  - Staff will assist patient in applying identified coping technique/skills  - Encourage attendance of scheduled groups and activities  Outcome: Progressing     Problem: SLEEP DISTURBANCE  Goal: Will exhibit normal sleeping pattern  Description: Interventions:  -  Assess the patients sleep pattern, noting recent changes  - Administer medication as ordered  - Decrease environmental stimuli, including noise, as appropriate during the night  - Encourage the patient to actively participate in unit groups and or exercise during the day to enhance ability to achieve adequate sleep at night  - Assess the patient, in the morning, encouraging a description of sleep experience  Outcome: Progressing

## 2023-07-27 NOTE — PROGRESS NOTES
FELIC[facility] Group Note     07/27/23 1530   Activity/Group Checklist   Group Personal control  (Mindfulness Techniques - Guided Meditation and 5 Senses Grounding)   Attendance Attended   Attendance Duration (min) 46-60   Interactions Interacted appropriately  (but reserved and guarded at times)   Affect/Mood Appropriate;Calm  (Attentive with nervous laughter)   Goals Achieved Discussed coping strategies; Able to listen to others; Able to engage in interactions; Able to reflect/comment on own behavior;Able to recieve feedback; Able to give feedback to another

## 2023-07-27 NOTE — PROGRESS NOTES
-Boots  -Shorts w/ string  -[employer] [place] [employer]  -[address] (6648)  -Credit One (2455)  -Discover (1545)  -Credit Karma (5037)  -Apple Card  -PA ID   -People First (9534)  -(2) Cigna (0607)(1055)  -(2) Currents (2820)(6799)  -CashApp ([phone])  -Paystubs  -[address] [place]  -Birth Certificate  -$1.23  -$2.80 in change    Bedside belongings;  -Red shirt  -Socks

## 2023-07-27 NOTE — ED NOTES
Crisis Intervention Specialist (CIS) met with patient (Pt) and completed intake and safety assessment. He denies suicidal and homicidal ideation. Pt also is denying hallucinations. He does admit to increase in anxiety attacks and stated that his anxiety includes racing thoughts that is making it difficult for him to work and concentrate on tasks. Pt also stated that he has had memories lately reminding him of past trauma events. Pt would not elaborate or disclose what the trauma consists of in past.  Pt stated that he sees a psychiatrist every 3 months at [facility] and has been taking Seroquel for some time daily. He stated that he just saw his psychiatrist who prescribed him Lamictil and he took it for 2 days however he stopped and stated that he did not feel right. Pt stated that he thought about suicide in 2018 after his mother passed but did not attempt. Pt stated that he recently quit his job at [employer] as he could not focus on work and admits to [employer] of others including his roomates. Pt has a cat at home who he stated one of his roomates will care for while he is gettign inpatient treatment. Pt is seeking inpatient treatment as he cannot function at a job or at home at this time. Pt was hospitalized in 2018 for suicidal ideation at [facility]. CIS gave and explained 201 to Pt as well as 201 handout. Pt and ED Dr signed [phone]. CIS faxed [phone] and facesheet to [facility] at intake and referral for review. [facility] stated that Jolene Fried has beds and can review Pt.

## 2023-07-27 NOTE — ED NOTES
Insurance Authorization for admission (primary):   Phone call placed to [facility]. Phone number: [phone]. Spoke to [place].     3 days approved (7/27/23-7/29/23). Level of care: inpatient mental health 201. Review on 7/31/23 (a care manager will be assigned and will outreach to [facility].). Authorization #: [id number]. Insurance Authorization for admission (secondary):   Phone call placed to [facility]. Phone number: [phone]. Spoke to [address] [place] [address] completed for 15 days (7/27/23-8/10/23)  Level of care: inpatient mental health 201. Contact at [facility] is Pee ext. [phone].   Authorization #: [id number]

## 2023-07-27 NOTE — NURSING NOTE
Pt admitted on 201 from [facility] ED with increased anxiety and paranoia. Denies SI/HI/AVH. Hx of SI over one year ago. Pt reports increased paranoia at work, disturbing memories of childhood neglect and "memories of my last outpatient at [facility] that I shouldn't have forgotten."  Reports being on Seroquel for several years and recently trialed Lamictal which made pt feel worse. Smokes 1/4 PPD, declined NRT at this time. Denies alcohol/drug abuse. Reports using "delta 8" from [employer], last use approx one week ago. UDS +THC. Reports intermittent episodes of feeling lightheaded, possibly related to anxiety/"overthinking."  Pt notes recently having elevated triglycerides.   Reviewed metabolic syndrome, lifestyle changes, etc.

## 2023-07-27 NOTE — ED NOTES
Patient is accepted at [facility]. Patient is accepted by Dr. Fe Griffiths per Marc Solano. Transportation is arranged with 11:30AM.    Transportation is scheduled for [facility]. Patient may go to the floor at 11:30AM or later. Nurse report is to be called to [phone] prior to patient transfer. Transport request submitted to [facility].

## 2023-07-27 NOTE — ED PROVIDER NOTES
History  Chief Complaint   Patient presents with   • Psychiatric Evaluation     Reports having some past memories come up and is having trouble coping with them. Pt is tearful in triage. States "I don't want to forget cause I don't want it to happen again." Denies SI/HI  Hospitalized through [facility] in past. Reports paranoia about someone hacking into his phone. Patient feeling sad and anxious. States he had forgotten some past memories and they recently resurfaced. Patient denies SI or HI. Recently took lamictal x 2 days then stopped as he didn't like how it made him feel. Also asked his psychiatrist to stop his seroquel. No ETOH, no drug use. States he feels paranoid like someone is on his phone. Denies AVH. Prior admission to [address] last year. Feels like he may need to be admitted. Very tearful. Prior to Admission Medications   Prescriptions Last Dose Informant Patient Reported? Taking? QUEtiapine (SEROquel) 200 mg tablet   Yes No   Sig: Take 200 mg by mouth every evening   QUEtiapine (SEROquel) 400 MG tablet  Self Yes No   Patient not taking: Reported on 9/2/2021   hydrOXYzine HCL (ATARAX) 10 mg tablet  Self Yes No   Sig: Take 10 mg by mouth 3 (three) times a day as needed   Patient not taking: Reported on 9/2/2021   hydrOXYzine HCL (ATARAX) 50 mg tablet  Self Yes No   Sig: Take 50 mg by mouth daily at bedtime as needed    methylprednisolone (MEDROL) 4 mg tablet  Self No No   Sig: Medrol dosepak, take as directed   Patient not taking: Reported on 5/6/2021   triamcinolone (KENALOG) 0.1 % cream  Self No No   Sig: Apply topically 3 (three) times a day      Facility-Administered Medications: None       Past Medical History:   Diagnosis Date   • Anxiety    • Bipolar disorder ([address])        History reviewed. No pertinent surgical history.     Family History   Problem Relation Age of Onset   • Heart disease Mother    • Bipolar disorder Sister    • Substance Abuse Sister    • Stroke Paternal Grandmother      I have reviewed and agree with the history as documented. E-Cigarette/Vaping   • E-Cigarette Use Current Every Day User      E-Cigarette/Vaping Substances   • Nicotine Yes    • THC Yes    • CBD No    • Flavoring Yes      Social History     Tobacco Use   • Smoking status: Some Days     Packs/day: 0.50     Years: 8.00     Total pack years: 4.00     Types: Cigarettes   • Smokeless tobacco: Never   Vaping Use   • Vaping Use: Every day   • Substances: Nicotine, THC, Flavoring   Substance Use Topics   • Alcohol use: Not Currently   • Drug use: Never       Review of Systems   Constitutional: Negative for activity change, appetite change and fever. HENT: Negative for congestion, rhinorrhea and sore throat. Eyes: Negative for pain and redness. Respiratory: Negative for cough, shortness of breath and wheezing. Cardiovascular: Negative for chest pain and palpitations. Gastrointestinal: Negative for abdominal pain, diarrhea, nausea and vomiting. Endocrine: Negative for polyuria. Genitourinary: Negative for difficulty urinating, dysuria, frequency and urgency. Musculoskeletal: Negative for arthralgias and myalgias. Skin: Negative for color change and rash. Allergic/Immunologic: Negative for immunocompromised state. Neurological: Negative for dizziness, syncope and light-headedness. Hematological: Does not bruise/bleed easily. Psychiatric/Behavioral: Positive for dysphoric mood and sleep disturbance. Negative for behavioral problems, confusion, hallucinations, self-injury and suicidal ideas. The patient is nervous/anxious. All other systems reviewed and are negative. Physical Exam  Physical Exam  Vitals and nursing note reviewed. Constitutional:       General: He is not in acute distress. Appearance: He is well-developed. HENT:      Head: Normocephalic and atraumatic. Nose: Nose normal.   Eyes:      General: No scleral icterus.      Conjunctiva/sclera: Conjunctivae normal.   Cardiovascular:      Rate and Rhythm: Normal rate and regular rhythm. Heart sounds: Normal heart sounds. Pulmonary:      Effort: Pulmonary effort is normal. No respiratory distress. Breath sounds: Normal breath sounds. No stridor. No wheezing. Abdominal:      General: There is no distension. Palpations: Abdomen is soft. Tenderness: There is no abdominal tenderness. There is no guarding or rebound. Musculoskeletal:         General: No deformity. Cervical back: Normal range of motion and neck supple. Skin:     General: Skin is warm and dry. Findings: No rash. Neurological:      Mental Status: He is alert and oriented to person, place, and time. Psychiatric:      Comments: Admits to depression. Feels anxious. Denies SI/HI. No AVH. Admits to paranoid thoughts. Tearful. Vital Signs  ED Triage Vitals [07/26/23 1925]   Temperature Pulse Respirations Blood Pressure SpO2   98.4 °F (36.9 °C) 104 18 (!) 145/104 98 %      Temp Source Heart Rate Source Patient Position - Orthostatic VS BP Location FiO2 (%)   Oral Monitor Sitting Right arm --      Pain Score       --           Vitals:    07/26/23 1925 07/26/23 2246   BP: (!) 145/104 125/84   Pulse: 104    Patient Position - Orthostatic VS: Sitting Lying         Visual Acuity      ED Medications  Medications   ALPRAZolam (XANAX) tablet 0.25 mg (0 mg Oral Hold 7/26/23 2308)       Diagnostic Studies  Results Reviewed     Procedure Component Value Units Date/Time    Rapid drug screen, urine [[id number]]  (Abnormal) Collected: 07/26/23 2253    Lab Status: Final result Specimen: Urine, Clean Catch Updated: 07/26/23 2320     Amph/Meth UR Negative     Barbiturate Ur Negative     Benzodiazepine Urine Negative     Cocaine Urine Negative     Methadone Urine Negative     Opiate Urine Negative     PCP Ur Negative     THC Urine Positive     Oxycodone Urine Negative    Narrative:      Presumptive report.  If requested, specimen will be sent to reference lab for confirmation. FOR MEDICAL PURPOSES ONLY. IF CONFIRMATION NEEDED PLEASE CONTACT THE LAB WITHIN 5 DAYS. Drug Screen Cutoff Levels:  AMPHETAMINE/METHAMPHETAMINES  1000 ng/mL  BARBITURATES     200 ng/mL  BENZODIAZEPINES     200 ng/mL  COCAINE      300 ng/mL  METHADONE      300 ng/mL  OPIATES      300 ng/mL  PHENCYCLIDINE     25 ng/mL  THC       50 ng/mL  OXYCODONE      100 ng/mL    POCT alcohol breath test [[id number]]  (Normal) Resulted: 07/26/23 2119    Lab Status: Final result Updated: 07/26/23 2119     EXTBreath Alcohol 0.000                 No orders to display              Procedures  Procedures         ED Course  ED Course as of 07/27/23 0006   Wed Jul 26, 2023   2225 Patient signed a [phone] Patient is medically cleared for inpatient psychiatric treatment. Medical Decision Making  Patient with depression and anxiety. Difficulty sleeping. Unable to work due to symptoms. No SI/HI. No aVH. + symptoms of paranoia. Wants to sign self in. Amount and/or Complexity of Data Reviewed  Labs: ordered. Discussion of management or test interpretation with external provider(s): Discussed with ED crisis. Patient will sign 201. Risk  Prescription drug management. Decision regarding hospitalization. Disposition  Final diagnoses:   Mixed hyperlipidemia     Time reflects when diagnosis was documented in both MDM as applicable and the Disposition within this note     Time User Action Codes Description Comment    7/27/2023 12:04 AM Yenifer Goodwin Add [E78.2] Mixed hyperlipidemia       ED Disposition     ED Disposition   Transfer to [facility]   --    Date/Time   Thu Jul 27, 2023 12:06 AM    Comment   Maude Quezada should be transferred out to inpatient psychiatry unit and has been medically cleared.            MD Jonathan Courtney   Sending MD Dr. Mari Cruz Information    None         Patient's Medications   Discharge Prescriptions    No medications on file       No discharge procedures on file.     PDMP Review     None          ED Provider  Electronically Signed by 10 mg by mouth 3 (three) times a day as needed, Starting Wed 4/7/2021, Historical Med      !! hydrOXYzine HCL (ATARAX) 50 mg tablet Take 50 mg by mouth daily at bedtime as needed , Starting Thu 8/5/2021, Historical Med      methylprednisolone (MEDROL) 4 mg tablet Medrol dosepak, take as directed, Normal      !! QUEtiapine (SEROquel) 200 mg tablet Take 200 mg by mouth every evening, Starting Mon 8/23/2021, Historical Med      !! QUEtiapine (SEROquel) 400 MG tablet Starting Mon 5/3/2021, Historical Med      triamcinolone (KENALOG) 0.1 % cream Apply topically 3 (three) times a day, Starting Sat 3/9/2019, Normal       !! - Potential duplicate medications found. Please discuss with provider. No discharge procedures on file.     PDMP Review     None          ED Provider  Electronically Signed by           Adam Connolly MD  08/08/23 3802

## 2023-07-27 NOTE — ED NOTES
CIS asked ED Rn to get Pt to provide UDS and re-check his blood pressure. Virgen Miranda from intake and referral asked for this to be completed.

## 2023-07-28 PROBLEM — F39 MOOD DISORDER (HCC): Status: ACTIVE | Noted: 2023-07-28

## 2023-07-28 PROBLEM — B07.9 WART OF HAND: Status: ACTIVE | Noted: 2023-07-28

## 2023-07-28 PROBLEM — Z00.8 MEDICAL CLEARANCE FOR PSYCHIATRIC ADMISSION: Status: ACTIVE | Noted: 2023-07-28

## 2023-07-28 LAB
25(OH)D3 SERPL-MCNC: 16.5 NG/ML (ref 30–100)
ALBUMIN SERPL BCP-MCNC: 4.9 G/DL (ref 3.5–5)
ALP SERPL-CCNC: 61 U/L (ref 34–104)
ALT SERPL W P-5'-P-CCNC: 10 U/L (ref 7–52)
ANION GAP SERPL CALCULATED.3IONS-SCNC: 10 MMOL/L
AST SERPL W P-5'-P-CCNC: 13 U/L (ref 13–39)
BASOPHILS # BLD AUTO: 0.03 THOUSANDS/ÂΜL (ref 0–0.1)
BASOPHILS NFR BLD AUTO: 1 % (ref 0–1)
BILIRUB SERPL-MCNC: 0.57 MG/DL (ref 0.2–1)
BUN SERPL-MCNC: 16 MG/DL (ref 5–25)
CALCIUM SERPL-MCNC: 9.9 MG/DL (ref 8.4–10.2)
CHLORIDE SERPL-SCNC: 104 MMOL/L (ref 96–108)
CHOLEST SERPL-MCNC: 187 MG/DL
CO2 SERPL-SCNC: 25 MMOL/L (ref 21–32)
CREAT SERPL-MCNC: 0.91 MG/DL (ref 0.6–1.3)
EOSINOPHIL # BLD AUTO: 0.08 THOUSAND/ÂΜL (ref 0–0.61)
EOSINOPHIL NFR BLD AUTO: 2 % (ref 0–6)
ERYTHROCYTE [DISTWIDTH] IN BLOOD BY AUTOMATED COUNT: 13.1 % (ref 11.6–15.1)
EST. AVERAGE GLUCOSE BLD GHB EST-MCNC: 94 MG/DL
FOLATE SERPL-MCNC: 9.5 NG/ML
GFR SERPL CREATININE-BSD FRML MDRD: 118 ML/MIN/1.73SQ M
GLUCOSE P FAST SERPL-MCNC: 80 MG/DL (ref 65–99)
GLUCOSE SERPL-MCNC: 80 MG/DL (ref 65–140)
HBA1C MFR BLD: 4.9 %
HCT VFR BLD AUTO: 47.3 % (ref 36.5–49.3)
HDLC SERPL-MCNC: 27 MG/DL
HGB BLD-MCNC: 16 G/DL (ref 12–17)
IMM GRANULOCYTES # BLD AUTO: 0 THOUSAND/UL (ref 0–0.2)
IMM GRANULOCYTES NFR BLD AUTO: 0 % (ref 0–2)
LDLC SERPL CALC-MCNC: 120 MG/DL (ref 0–100)
LYMPHOCYTES # BLD AUTO: 2.53 THOUSANDS/ÂΜL (ref 0.6–4.47)
LYMPHOCYTES NFR BLD AUTO: 52 % (ref 14–44)
MCH RBC QN AUTO: 29.7 PG (ref 26.8–34.3)
MCHC RBC AUTO-ENTMCNC: 33.8 G/DL (ref 31.4–37.4)
MCV RBC AUTO: 88 FL (ref 82–98)
MONOCYTES # BLD AUTO: 0.43 THOUSAND/ÂΜL (ref 0.17–1.22)
MONOCYTES NFR BLD AUTO: 9 % (ref 4–12)
NEUTROPHILS # BLD AUTO: 1.74 THOUSANDS/ÂΜL (ref 1.85–7.62)
NEUTS SEG NFR BLD AUTO: 36 % (ref 43–75)
NONHDLC SERPL-MCNC: 160 MG/DL
NRBC BLD AUTO-RTO: 0 /100 WBCS
PLATELET # BLD AUTO: 238 THOUSANDS/UL (ref 149–390)
PMV BLD AUTO: 10.2 FL (ref 8.9–12.7)
POTASSIUM SERPL-SCNC: 3.6 MMOL/L (ref 3.5–5.3)
PROT SERPL-MCNC: 7.4 G/DL (ref 6.4–8.4)
RBC # BLD AUTO: 5.38 MILLION/UL (ref 3.88–5.62)
SODIUM SERPL-SCNC: 139 MMOL/L (ref 135–147)
TRIGL SERPL-MCNC: 202 MG/DL
TSH SERPL DL<=0.05 MIU/L-ACNC: 1.16 UIU/ML (ref 0.45–4.5)
VIT B12 SERPL-MCNC: 89 PG/ML (ref 180–914)
WBC # BLD AUTO: 4.81 THOUSAND/UL (ref 4.31–10.16)

## 2023-07-28 PROCEDURE — 82746 ASSAY OF FOLIC ACID SERUM: CPT | Performed by: PHYSICIAN ASSISTANT

## 2023-07-28 PROCEDURE — 99222 1ST HOSP IP/OBS MODERATE 55: CPT | Performed by: PHYSICIAN ASSISTANT

## 2023-07-28 PROCEDURE — 82306 VITAMIN D 25 HYDROXY: CPT | Performed by: PHYSICIAN ASSISTANT

## 2023-07-28 PROCEDURE — 80053 COMPREHEN METABOLIC PANEL: CPT | Performed by: PHYSICIAN ASSISTANT

## 2023-07-28 PROCEDURE — 80061 LIPID PANEL: CPT | Performed by: PHYSICIAN ASSISTANT

## 2023-07-28 PROCEDURE — 99223 1ST HOSP IP/OBS HIGH 75: CPT | Performed by: PSYCHIATRY & NEUROLOGY

## 2023-07-28 PROCEDURE — 85025 COMPLETE CBC W/AUTO DIFF WBC: CPT | Performed by: PHYSICIAN ASSISTANT

## 2023-07-28 PROCEDURE — 82607 VITAMIN B-12: CPT | Performed by: PHYSICIAN ASSISTANT

## 2023-07-28 PROCEDURE — 86780 TREPONEMA PALLIDUM: CPT | Performed by: PHYSICIAN ASSISTANT

## 2023-07-28 PROCEDURE — 84443 ASSAY THYROID STIM HORMONE: CPT | Performed by: PHYSICIAN ASSISTANT

## 2023-07-28 PROCEDURE — 83036 HEMOGLOBIN GLYCOSYLATED A1C: CPT | Performed by: PHYSICIAN ASSISTANT

## 2023-07-28 RX ORDER — POLYETHYLENE GLYCOL 3350 17 G/17G
17 POWDER, FOR SOLUTION ORAL DAILY PRN
Status: DISCONTINUED | OUTPATIENT
Start: 2023-07-28 | End: 2023-07-31 | Stop reason: HOSPADM

## 2023-07-28 RX ORDER — BISACODYL 10 MG
10 SUPPOSITORY, RECTAL RECTAL DAILY PRN
Status: DISCONTINUED | OUTPATIENT
Start: 2023-07-28 | End: 2023-07-31 | Stop reason: HOSPADM

## 2023-07-28 NOTE — NURSING NOTE
Patient deborah denies MYRNA VENTURA  at present, states he is here because he wants to stop taking his medications.   Patient does attend groups

## 2023-07-28 NOTE — H&P
Psychiatric Evaluation - [address] 20 y.o. male MRN: [id number]  [facility] Encounter: [id number]    Assessment/Plan   Principal Problem:    Mood disorder ([address])  Active Problems:    Tobacco use disorder    Mixed hyperlipidemia      Plan:   D/C Seroquel; patient wants to have a drug holiday and determine if medications are still needed, or not. Pt to be monitored in therapeutic milieu over the weekend, and then will determine Monday if medication management is necessary. Admit to [address] [place] [address] psychiatry. Medical management per SLIM. Check admission labs: CMP, CBC, TSH, RPR, UDS, Lipid Panel, A1c. Collaborate with case management for baseline assessment and disposition planning.   Chart reviewed and case discussed with treatment team.  Start/continue the following medications:  Current Facility-Administered Medications   Medication Dose Route Frequency Provider Last Rate   • acetaminophen  650 mg Oral Q6H PRN Kitty Fabry, MD     • acetaminophen  650 mg Oral Q4H PRN Kitty Fabry, MD     • acetaminophen  975 mg Oral Q6H PRN Kitty Fabry, MD     • aluminum-magnesium hydroxide-simethicone  30 mL Oral Q4H PRN Kitty Fabry, MD     • haloperidol lactate  2.5 mg Intramuscular Q4H PRN Max 4/day Kitty Fabry, MD      And   • LORazepam  1 mg Intramuscular Q4H PRN Max 4/day Kitty Fabry, MD      And   • benztropine  0.5 mg Intramuscular Q4H PRN Max 4/day Kitty Fabry, MD     • haloperidol lactate  5 mg Intramuscular Q4H PRN Max 4/day Kitty Fabry, MD      And   • LORazepam  2 mg Intramuscular Q4H PRN Max 4/day Kitty Fabry, MD      And   • benztropine  1 mg Intramuscular Q4H PRN Max 4/day Kitty Fabry, MD     • benztropine  1 mg Oral Q4H PRN Max 6/day Kitty Fabry, MD     • bisacodyl  10 mg Rectal Daily PRN Hero Epstein MD     • hydrOXYzine HCL  50 mg Oral Q6H PRN Max 4/day Kitty Fabry, MD      Or • diphenhydrAMINE  50 mg Intramuscular Q6H PRN Fantasma Snow MD     • haloperidol  1 mg Oral Q6H PRN Fantasma Snow MD     • haloperidol  2.5 mg Oral Q4H PRN Max 4/day Fantasma Snow MD     • haloperidol  5 mg Oral Q4H PRN Max 4/day Fantasma Snow MD     • hydrOXYzine HCL  100 mg Oral Q6H PRN Max 4/day Fantasma Snow MD      Or   • LORazepam  2 mg Intramuscular Q6H PRN Fantasma Snow MD     • hydrOXYzine HCL  25 mg Oral Q6H PRN Max 4/day Fantasma Snow MD     • nicotine polacrilex  4 mg Oral Q2H PRN Fantasma Snow MD     • polyethylene glycol  17 g Oral Daily PRN Salima Corado MD     • senna-docusate sodium  1 tablet Oral Daily PRN Fantasma Snow MD     • traZODone  50 mg Oral HS PRN Fantasma Snow MD         Treatment options and alternatives were reviewed with the patient, who concurs with the above plan. Risks, benefits, and possible side effects of medications were explained to the patient, and he verbalizes understanding.      -----------------------------------    Chief Complaint: Paranoia    History of Present Illness     Per Crisis worker note:  Crisis Intervention Specialist (CIS) met with patient (Pt) and completed intake and safety assessment. He denies suicidal and homicidal ideation. Pt also is denying hallucinations. He does admit to increase in anxiety attacks and stated that his anxiety includes racing thoughts that is making it difficult for him to work and concentrate on tasks. Pt also stated that he has had memories lately reminding him of past trauma events. Pt would not elaborate or disclose what the trauma consists of in past.  Pt stated that he sees a psychiatrist every 3 months at [facility] and has been taking Seroquel for some time daily. He stated that he just saw his psychiatrist who prescribed him Lamictil and he took it for 2 days however he stopped and stated that he did not feel right.   Pt stated that he thought about suicide in 2018 after his mother passed but did not attempt. Pt stated that he recently quit his job at [employer] as he could not focus on work and admits to [employer] of others including his roomates. Pt has a cat at home who he stated one of his roomates will care for while he is gettign inpatient treatment. Pt is seeking inpatient treatment as he cannot function at a job or at home at this time. Pt was hospitalized in 2018 for suicidal ideation at [facility]. CIS gave and explained 201 to Pt as well as 201 handout. Pt and ED Dr signed 12. On admission to Inpatient Psychiatric Unit:  Patient is a 58-year-old biological male with an unclear gender identity, and a past psychiatric history stated as bipolar disorder which of note the patient doubts, who presents voluntarily to inpatient BHU with paranoia. The patient's symptoms started several weeks ago and have been progressively worsening. The exacerbating stressors are recently having memories of prior traumatic events which the patient refuses to describe. There are no mitigating factors present. The patient's symptoms include paranoia. The patient reports that he was diagnosed with bipolar 1 disorder but then noticed when reading his chart online that the diagnosis was unclear. The patient then began to think that he did not need to take Seroquel and Lamictal if his diagnosis was not correct. The patient called the emergency room asking if he could be taken off of his medications in the emergency room told him reportedly that they do not do that, so the patient came for psychiatric hospitalization. On my exam, there is no evidence of psychosis or maryam. The patient is a bit vague and cryptic in his requests but states that he wants to be off medication so that he can determine if he needs medication and what his true diagnosis is.   We discussed plan to do a drug holiday this weekend and then reassess on Monday morning to determine if medication management will be necessary. At this time, the patient denies suicidal thoughts or behaviors. He denies depressive or manic symptomatology. He denies psychotic symptoms other than feeling paranoid. He does have 1 prior hospitalization at which time he was feeling suicidal. He was previously taking Seroquel and Lamictal and seeing a Psychiatrist in [facility]) every few months. Psychiatric Review Of Systems:  Medication side effects: none  Sleep: no change  Appetite: no change  Hygiene: able to tend to instrumental and basic ADLs  Anxiety Symptoms: denies  Psychotic Symptoms: per HPI  Depression Symptoms: denies  Manic Symptoms: denies  PTSD Symptoms: denies  Suicidal Thoughts: denies  Homicidal Thoughts: denies    Medical Review Of Systems:   Patient denies headache or dizziness. Patient denies chest pain or palpitations. Patient denies difficulty breathing or wheezing. Patient denies nausea, vomiting, or diarrhea. Patient denies polyuria or polydipsia. Patient denies weakness or numbness. Pertinent positives as per HPI. Historical Information     Psychiatric History:   Diagnoses: BPAD1  Inpatient Hx: 1  Outpatient Hx: [facility]) Psychiatrist  Medications/Trials: Was previously on Lamictal and Seroquel    Substance Abuse History:  Social History     Substance and Sexual Activity   Alcohol Use Not Currently     Social History     Substance and Sexual Activity   Drug Use Not Currently   • Types: Other    Comment: delta 8 one week ago       I spent time with Zeb Hinson in counseling and education on risk of substance abuse. I assessed motivation and encouraged him for treatment as appropriate.      Family History:   Family History   Problem Relation Age of Onset   • Heart disease Mother    • Bipolar disorder Sister    • Substance Abuse Sister    • Stroke Paternal Grandmother        Social History:  Highest education: HS  Currently living: Roommates  Relationships: Single  Children: None  Occupation: Previously worked at [employer], but quit  No legal hx  No  hx      Rest of social history as per below:    Social History     Socioeconomic History   • Marital status: Single     Spouse name: Not on file   • Number of children: Not on file   • Years of education: Not on file   • Highest education level: Not on file   Occupational History   • Not on file   Tobacco Use   • Smoking status: Some Days     Packs/day: 0.25     Years: 8.00     Total pack years: 2.00     Types: Cigarettes   • Smokeless tobacco: Never   Vaping Use   • Vaping Use: Every day   • Substances: Nicotine, Flavoring   Substance and Sexual Activity   • Alcohol use: Not Currently   • Drug use: Not Currently     Types: Other     Comment: delta 8 one week ago   • Sexual activity: Not Currently   Other Topics Concern   • Not on file   Social History Narrative   • Not on file     Social Determinants of Health     Financial Resource Strain: Not on file   Food Insecurity: Not on file   Transportation Needs: Not on file   Physical Activity: Not on file   Stress: Not on file   Social Connections: Not on file   Intimate Partner Violence: Not on file   Housing Stability: Not on file       Past Medical History:   Past Medical History:   Diagnosis Date   • Anxiety    • Bipolar disorder ([address] [facility])         -----------------------------------  Objective    Temp:  [98.1 °F (36.7 °C)-99.1 °F (37.3 °C)] 99.1 °F (37.3 °C)  HR:  [58-91] 58  Resp:  [16-17] 16  BP: (125-152)/(74-99) 133/81    Mental Status Evaluation:  Appearance: casually dressed, appears consistent with stated age  Motor: PMR, no gait abnormalities  Behavior: cooperative, interacts with this writer appropriately  Speech: normal rate, rhythm, and volume  Mood: "I don't know"  Affect: constricted  Thought Process: organized, linear, and goal-oriented  Thought Content: denies auditory hallucinations, denies visual hallucinations, denies delusions  Risk Potential: denies suicidal ideation, plan, or intent. Denies homicidal ideation  Sensorium: Oriented to person, place, time, and situation  Cognition: cognitive ability appears intact but was not quantitatively tested  Consciousness: alert and awake  Attention: able to focus without difficulty  Insight: fair  Judgement: fair      Meds/Allergies   Allergies   Allergen Reactions   • Penicillins Vomiting   • Pollen Extract Hives         Behavioral Health Medications: all current active meds have been reviewed as per above. Changes as per above. Risks, benefits, indications, and alternatives to treatment were discussed with patient. Laboratory results:  I have personally reviewed all pertinent laboratory/tests results.   Recent Results (from the past 48 hour(s))   POCT alcohol breath test    Collection Time: 07/26/23  9:19 PM   Result Value Ref Range    EXTBreath Alcohol 0.000    Rapid drug screen, urine    Collection Time: 07/26/23 10:53 PM   Result Value Ref Range    Amph/Meth UR Negative Negative    Barbiturate Ur Negative Negative    Benzodiazepine Urine Negative Negative    Cocaine Urine Negative Negative    Methadone Urine Negative Negative    Opiate Urine Negative Negative    PCP Ur Negative Negative    THC Urine Positive (A) Negative    Oxycodone Urine Negative Negative   CBC and differential    Collection Time: 07/28/23  6:39 AM   Result Value Ref Range    WBC 4.81 4.31 - 10.16 Thousand/uL    RBC 5.38 3.88 - 5.62 Million/uL    Hemoglobin 16.0 12.0 - 17.0 g/dL    Hematocrit 47.3 36.5 - 49.3 %    MCV 88 82 - 98 fL    MCH 29.7 26.8 - 34.3 pg    MCHC 33.8 31.4 - 37.4 g/dL    RDW 13.1 11.6 - 15.1 %    MPV 10.2 8.9 - 12.7 fL    Platelets 995 678 - 231 Thousands/uL    nRBC 0 /100 WBCs    Neutrophils Relative 36 (L) 43 - 75 %    Immat GRANS % 0 0 - 2 %    Lymphocytes Relative 52 (H) 14 - 44 %    Monocytes Relative 9 4 - 12 %    Eosinophils Relative 2 0 - 6 %    Basophils Relative 1 0 - 1 %    Neutrophils Absolute 1.74 (L) 1.85 - 7.62 Thousands/µL    Immature Grans Absolute 0.00 0.00 - 0.20 Thousand/uL    Lymphocytes Absolute 2.53 0.60 - 4.47 Thousands/µL    Monocytes Absolute 0.43 0.17 - 1.22 Thousand/µL    Eosinophils Absolute 0.08 0.00 - 0.61 Thousand/µL    Basophils Absolute 0.03 0.00 - 0.10 Thousands/µL        Progress Toward Goals & Illness Status: Patient is not at goal. They are psychiatrically unstable and are not yet ready for discharge. The patient's condition currently requires active psychopharmacological medication management, interdisciplinary coordination with case management, and the utilization of adjunctive milieu and group therapy to augment psychopharmacological efficacy. The patient's risk of morbidity, and progression or decompensation of psychiatric disease, is high without this current treatment.           -----------------------------------    Risks / Benefits of Treatment:     Risks, benefits, and possible side effects of medications explained to patient. The patient verbalizes understanding and agreement for treatment. Counseling / Coordination of Care:     Patient's presentation on admission and proposed treatment plan were discussed with the treatment team.  Diagnosis, medication changes and treatment plan were reviewed with the patient. Recent stressors were discussed with the patient. Events leading to admission were reviewed with the patient. Importance of medication and treatment compliance was reviewed with the patient. Inpatient Psychiatric Certification:     Certification: Based upon physical, mental and social evaluations, I certify that inpatient psychiatric services are medically necessary for this patient for a duration of 5-7 midnights (exact duration TBD pending patient's response to psychiatric treatment) for the diagnosis listed above. Available alternative community resources do not meet the patient's mental health care needs.   I further attest that an established written individualized plan of care has been implemented and is outlined in the patient's medical records. This note has been constructed using a voice recognition system. There may be translation, syntax, or grammatical errors. If you have any questions, please contact the dictating provider.

## 2023-07-28 NOTE — PROGRESS NOTES
New admission - 201 from [facility] ED. Anxiety, paranoia, bizarre and reported racing thoughts. Social with peers. Denies SI/HI. DC: TBD    07/28/23 0754   Team Meeting   Meeting Type Daily Rounds   Team Members Present   Team Members Present Physician;Nurse;; Other (Discipline and Name)   Physician Team Member Dr. Chong Syed / Dr. Burak Doll / Alina Ocasio Member August Saucedo / Davina Re Management Team Member Pat Anand / Kamari Recio / Salvatore Tirado   Other (Discipline and Name) Sulaiman Zacarias - Art Therapy   Patient/Family Present   Patient Present No   Patient's Family Present No

## 2023-07-28 NOTE — CMS CERTIFICATION NOTE
Recertification: Based upon physical, mental and social evaluations, I certify that inpatient psychiatric services continue to be medically necessary for this patient for a duration of 7 midnights for the treatment of  Mood disorder [facility])   Available alternative community resources still do not meet the patient's mental health care needs. I further attest that an established written individualized plan of care has been updated and is outlined in the patient's medical records.

## 2023-07-28 NOTE — CONSULTS
[address]  Consult  Name: Mars Harrington 20 y.o. male I MRN: [id number]  Unit/Bed#: -02 I Date of Admission: 7/27/2023   Date of Service: 7/28/2023 I Hospital Day: 1    Inpatient consult for Medical Clearance for [address] patient  Consult performed by: Ivon Chaidez PA-C  Consult ordered by: Pb Eden MD          Assessment/Plan   Medical clearance for psychiatric admission  Assessment & Plan  • Admission labs reviewed, CBC, CMP, TSH, acceptable  o Lipid panel reveal elevated glycerides and LDL, recommend dietary changes, ultimately may require initiation of Lipitor 10 mg daily  • Hemoglobin A1c, total syphilis, folate, vitamin B12, vitamin D 25-hydroxy, TSH, v UA, lipid panel, CBC, CMP labs pending  • Vitals stable   • UDS positive for THC  • COVID-19 negative  • EKG reveals NSR, 54bpm   • Medically stable for continued inpatient psychiatric treatment based on available results  • Please contact SLIM with any questions or concerns      Wart of hand  Assessment & Plan  · Discussed outpatient OTC treatments  · If this fails, would recommend dermatology referral for removal    Mixed hyperlipidemia  Assessment & Plan  · Lipid Panel reveals elevation  · Recommend dietary changes and lifestyle modifications  · If this fails to improve, may need to consider initiation of Lipitor 10 mg daily with PCP    Tobacco use disorder  Assessment & Plan  · Counseled on cessation  · Nicotine replacement therapy while admitted            VTE Prophylaxis:   Low Risk (Score 0-2) - Encourage Ambulation. Recommendations for Discharge:  · Discharge timeline per primary team.  Routine follow-up with primary care provider.    on cessation from smoking    Total Time Spent on Date of Encounter in care of patient: 35 minutes This time was spent on one or more of the following: performing physical exam; counseling and coordination of care; obtaining or reviewing history; documenting in the medical record; reviewing/ordering tests, medications or procedures; communicating with other healthcare professionals and discussing with patient's family/caregivers. Collaboration of Care: Were Recommendations Directly Discussed with Primary Treatment Team? No    History of Present Illness:  Essence Baxter is a 20 y.o. male who is originally admitted to the behavioral health service due to paranoia, resurfaced memories. We are consulted for management of chronic medical conditions. Patient denies any chronic medical conditions for which he takes daily medications. Patient should continue all prior to admission medications as prescribed by primary care provider/outpatient specialists. Patient denies recent travel, illness or sick contacts. Patient admits to smoking, denies alcohol or drug use. Available admission lab work and vitals are acceptable. Patient feels a baseline physical health with the exception of some warts he has noticed on his finger, we discussed OTC treatment options for after discharge. Patient appears medically stable for inpatient psychiatric treatment at this time. Please contact SLIM with any medical questions or concerns if issues should arise. Review of Systems:  Review of Systems   Psychiatric/Behavioral: The patient is nervous/anxious. All other systems reviewed and are negative. Past Medical and Surgical History:   Past Medical History:   Diagnosis Date   • Anxiety    • Bipolar disorder ([address])        No past surgical history on file. Meds/Allergies:  PTA meds:   Prior to Admission Medications   Prescriptions Last Dose Informant Patient Reported? Taking?    QUEtiapine (SEROquel) 200 mg tablet Not Taking  Yes No   Sig: Take 200 mg by mouth every evening   Patient not taking: Reported on 7/27/2023   QUEtiapine (SEROquel) 400 MG tablet  Self Yes Yes   hydrOXYzine HCL (ATARAX) 10 mg tablet  Self Yes No   Sig: Take 10 mg by mouth 3 (three) times a day as needed Patient not taking: Reported on 9/2/2021   hydrOXYzine HCL (ATARAX) 50 mg tablet Not Taking Self Yes No   Sig: Take 50 mg by mouth daily at bedtime as needed    Patient not taking: Reported on 7/27/2023   methylprednisolone (MEDROL) 4 mg tablet  Self No No   Sig: Medrol dosepak, take as directed   Patient not taking: Reported on 5/6/2021   triamcinolone (KENALOG) 0.1 % cream  Self No No   Sig: Apply topically 3 (three) times a day      Facility-Administered Medications: None       Allergies: Allergies   Allergen Reactions   • Penicillins Vomiting   • Pollen Extract Hives       Social History:  Marital Status: Single  Substance Use History:   Social History     Substance and Sexual Activity   Alcohol Use Not Currently     Social History     Tobacco Use   Smoking Status Some Days   • Packs/day: 0.25   • Years: 8.00   • Total pack years: 2.00   • Types: Cigarettes   Smokeless Tobacco Never     Social History     Substance and Sexual Activity   Drug Use Not Currently   • Types: Other    Comment: delta 8 one week ago       Family History:  Family History   Problem Relation Age of Onset   • Heart disease Mother    • Bipolar disorder Sister    • Substance Abuse Sister    • Stroke Paternal Grandmother        Physical Exam:   Vitals:   Blood Pressure: 133/81 (07/28/23 0734)  Pulse: 58 (07/28/23 0734)  Temperature: 99.1 °F (37.3 °C) (07/28/23 0734)  Temp Source: Tympanic (07/28/23 0734)  Respirations: 16 (07/28/23 0734)  Height: 5' 7" (170.2 cm) (07/27/23 1155)  Weight - Scale: 93.4 kg (206 lb) (07/27/23 1155)  SpO2: 98 % (07/28/23 0734)    Physical Exam  Vitals and nursing note reviewed. Constitutional:       General: He is awake. He is not in acute distress. HENT:      Head: Normocephalic and atraumatic. Cardiovascular:      Rate and Rhythm: Normal rate and regular rhythm. Heart sounds: No murmur heard. Pulmonary:      Effort: Pulmonary effort is normal.      Breath sounds: Normal breath sounds.    Abdominal: General: There is no distension. Palpations: Abdomen is soft. Musculoskeletal:      Right lower leg: No edema. Left lower leg: No edema. Skin:     General: Skin is warm and dry. Comments: Warts on fingers   Neurological:      Mental Status: He is alert. Comments: CN II-XII grossly intact        Additional Data:   Lab Results:    Results from last 7 days   Lab Units 07/28/23  0639   WBC Thousand/uL 4.81   HEMOGLOBIN g/dL 16.0   HEMATOCRIT % 47.3   PLATELETS Thousands/uL 238   NEUTROS PCT % 36*   LYMPHS PCT % 52*   MONOS PCT % 9   EOS PCT % 2     Results from last 7 days   Lab Units 07/28/23  0639   SODIUM mmol/L 139   POTASSIUM mmol/L 3.6   CHLORIDE mmol/L 104   CO2 mmol/L 25   BUN mg/dL 16   CREATININE mg/dL 0.91   ANION GAP mmol/L 10   CALCIUM mg/dL 9.9   ALBUMIN g/dL 4.9   TOTAL BILIRUBIN mg/dL 0.57   ALK PHOS U/L 61   ALT U/L 10   AST U/L 13   GLUCOSE RANDOM mg/dL 80             No results found for: "HGBA1C"            Imaging: No pertinent imaging reviewed. No orders to display       EKG, Pathology, and Other Studies Reviewed on Admission:   · EKG: NSR. HR 54bpm, . ** Please Note: This note may have been constructed using a voice recognition system.  **

## 2023-07-28 NOTE — TREATMENT PLAN
TREATMENT PLAN REVIEW - [id number] [facility] [age] y.o. 2000 male MRN: [id number]    Radha Otto Room / Bed: [address] [address] Encounter: [id number]          Admit Date/Time:  7/27/2023 11:49 AM    Treatment Team: Attending Provider: Matthew Vinson DO; Patient Care Assistant: Chandler Cantor; Patient Care Technician: Tamera Whipple; Patient Care Assistant: Peri Scruggs; Care Manager: Mario De Los Santos RN; Patient Care Technician: Bonifacio Irene; Registered Nurse: Madeline Urbina RN; Registered Nurse: Sulema Jones, RN; Physician Assistant: Mariam Santiago; Charge Nurse: Rafaela Giron RN; Patient Care Assistant: Olvin Trimble;  : Rhea Pisano    Diagnosis: Principal Problem:    Mood disorder ([address])  Active Problems:    Tobacco use disorder    Mixed hyperlipidemia      Patient Strengths/Assets: good physical health    Patient Barriers/Limitations: poor past treatment response    Short Term Goals: decrease in paranoid thoughts    Long Term Goals: improvement in paranoia    Progress Towards Goals: improving gradually    Recommended Treatment: medication management, patient medication education, group therapy, milieu therapy, continued Behavioral Health psychiatric evaluation/assessment process    Treatment Frequency: daily medication monitoring, group and milieu therapy daily, monitoring through interdisciplinary rounds, monitoring through weekly patient care conferences    Expected Discharge Date:  5-7 days    Discharge Plan: discharge to home    Treatment Plan Created/Updated By: Matthew Vinson DO

## 2023-07-28 NOTE — CASE MANAGEMENT
INTAKE    Readmit score:  Red 25   Confirmed Address   [address]: [place]     Resides in the home with/can return?:    Pt lives with 3 roommates in apartment and can return. Pt reported their lease is up in September and the landlord may want them out sooner than September. Pt reported he may go to live with his sister in [place] or with a family friend. Confirmed Phone Number: [phone]   Commitment Status/Admitted from: [facility] ED   Presenting C/O:             ED Note   "  Psychiatric Evaluation        Reports having some past memories come up and is having trouble coping with them. Pt is tearful in triage. States "I don't want to forget cause I don't want it to happen again." Denies SI/HI  Hospitalized through [facility] in past. Reports paranoia about someone hacking into his phone.        Patient feeling sad and anxious. States he had forgotten some past memories and they recently resurfaced. Patient denies SI or HI. Recently took lamictal x 2 days then stopped as he didn't like how it made him feel. Also asked his psychiatrist to stop his seroquel. No ETOH, no drug use. States he feels paranoid like someone is on his phone. Denies AVH. Prior admission to [address] last year. Feels like he may need to be admitted. Very tearful."     Outpatient:    [facility] for Psychiatrist, pt does not have therapist.   Pt reported he sees Psychiatrist every 3 months. Pt requested to stop medications to determine if he needs to be on them. Pt taking medication holiday and will be observed over the weekend and then determine if meds are needed or not. CM spoke to pt about [facility] and he will think about it. He reported he may be interested in the Virtual option to attend that until he moves.       ACT/ICM/CPS/WRT/SC: N/A   PCP:    Taylor Morillo PA-C   Work/Income:      Pt reported he works at [employer] [place]. Pt reported he came to the hospital while on shift at [facility] and does not think he will return there after dc. Legal/  Probation/[place] Ofc:    Denies   Access to Firearms:    Denies   Referrals Needed: [facility] ?     Transport at Discharge:    Pt will need transportation    IMM:   [employer] Text [place]: N/A   Emergency Contact:     Julia Correshelly - [phone]   ROIs obtained:       None at time of intake    Insurance:     [facility] MENTAL HEALTH COB   Audit:        PAWSS:  BAT:  UDS: + for THC (uses [employer] from gas station)

## 2023-07-28 NOTE — PROGRESS NOTES
Diagnosis of Mood disorder reviewed. Short term goals for decrease in paranoid thoughts discussed. All parties in agreement and treatment plan signed.     07/28/23 1241   Team Meeting   Meeting Type Tx Team Meeting   Team Members Present   Team Members Present Physician;Nurse;   Physician Team Member Dr. Dorie Mo Team Member [facility] Management Team Member Dami   Patient/Family Present   Patient Present Yes   Patient's Family Present No

## 2023-07-28 NOTE — ASSESSMENT & PLAN NOTE
· Lipid Panel reveals elevation  · Recommend dietary changes and lifestyle modifications  · If this fails to improve, may need to consider initiation of Lipitor 10 mg daily with PCP

## 2023-07-28 NOTE — DISCHARGE INSTR - APPOINTMENTS
You will be discharged to [facility], [facility]   You confirmed that your cell phone number is [phone]        Bernice Macias or Rochelle, our [facility] [facility] [address], will be calling you after your discharge, on the phone number that you provided. They will be available as an additional support, if needed. If you wish to speak with one of them, you may contact Bernice Macias at [phone] or Prachi Hubbard at [phone].

## 2023-07-28 NOTE — ASSESSMENT & PLAN NOTE
• Admission labs reviewed, CBC, CMP, TSH, acceptable  o Lipid panel reveal elevated glycerides and LDL, recommend dietary changes, ultimately may require initiation of Lipitor 10 mg daily  • Hemoglobin A1c, total syphilis, folate, vitamin B12, vitamin D 25-hydroxy, TSH, v UA, lipid panel, CBC, CMP labs pending  • Vitals stable   • UDS positive for THC  • COVID-19 negative  • EKG reveals NSR, 54bpm   • Medically stable for continued inpatient psychiatric treatment based on available results  • Please contact [facility] with any questions or concerns

## 2023-07-28 NOTE — PLAN OF CARE
Problem: DEPRESSION  Goal: Will be euthymic at discharge  Description: INTERVENTIONS:  - Administer medication as ordered  - Provide emotional support via 1:1 interaction with staff  - Encourage involvement in milieu/groups/activities  - Monitor for social isolation  Outcome: Progressing     Problem: ANXIETY  Goal: Will report anxiety at manageable levels  Description: INTERVENTIONS:  - Administer medication as ordered  - Teach and encourage coping skills  - Provide emotional support  - Assess patient/family for anxiety and ability to cope  Outcome: Progressing  Goal: By discharge: Patient will verbalize 2 strategies to deal with anxiety  Description: Interventions:  - Identify any obvious source/trigger to anxiety  - Staff will assist patient in applying identified coping technique/skills  - Encourage attendance of scheduled groups and activities  Outcome: Progressing     Problem: SLEEP DISTURBANCE  Goal: Will exhibit normal sleeping pattern  Description: Interventions:  -  Assess the patients sleep pattern, noting recent changes  - Administer medication as ordered  - Decrease environmental stimuli, including noise, as appropriate during the night  - Encourage the patient to actively participate in unit groups and or exercise during the day to enhance ability to achieve adequate sleep at night  - Assess the patient, in the morning, encouraging a description of sleep experience  Outcome: Progressing     Problem: Ineffective Coping  Goal: Participates in unit activities  Description: Interventions:  - Provide therapeutic environment   - Provide required programming   - Redirect inappropriate behaviors   Outcome: Progressing

## 2023-07-28 NOTE — ASSESSMENT & PLAN NOTE
· Discussed outpatient OTC treatments  · If this fails, would recommend dermatology referral for removal

## 2023-07-28 NOTE — DISCHARGE INSTR - OTHER ORDERS
[address] is a confidential 7 days/week telephone support service manned by trained mental health consumers. [facility] operates daily but is not able to accept calls between 2AM-6AM.   [facility] provides support, a listening ear and can provide information about available services. [facility] specializes in the concerns of mental health consumers, their families and friends. However, we are also here for anyone who has a mental health concern, is confused about or just doesn't know anything about mental health or where to get information. To reach Stephane, call [phone] accepts calls between 6:00 AM to 10:00 AM and from 4:00 PM to 12:00 AM.   Text CONNECT to 063688 from anywhere in the [facility], anytime, about any type of crisis. A live, trained Crisis Counselor receives the text and lets you know that they are here to listen. The volunteer Crisis Counselor will help you move from a hot moment to a cool moment. [facility] ([facility]) AT [facility] - licensed telephone and mobile crisis services that provide mental health assessments to all age groups regardless of income or insurance. Crisis Intervention operates 24-hour/7 days a week. [address] assists consumer who are experiencing a mental health emergency and lack the resources to assist themselves. Immediate intervention for suicidal and depressed individuals with home visits/outreach being top priority. Crisis can be contacted at [phone]. The [facility] ([facility]) offers various education & support groups for you & your family. For more information visit their website at   http://www.[employer]itO Networks/.  Dial 2-1-1 to get connected/get help.   Free, confidential information & referral available 24/7: Aging Services, Child & Youth Services, Counseling, Education/Training, Food/Shelter/Clothing, Health Services, Parenting, Substance Abuse, Support Groups, Volunteer Opportunities, & much more. Phone: 2-1-1 or [phone], Web: SIN.KE656PUZO.JTV, Email: Nancy@[employer]        During your hospitalization your  spoke to you about attending a [address] after discharge. If you are interested, you can call [facility] (Phone: [phone]) or [address] [place] [address] (Phone: [phone]) to discuss which program you feel will be best for you. [facility] Partial Hospitalization   [place], [place] [address]  Phone: [phone]  Fax: [phone]    Provided Monday through Friday, from 9 a.m.-3 p.m. Offered to adults needing intensive treatment, as an alternative to 24-hour hospitalization that might disrupt their home life and other support systems  Our program includes:  Interdisciplinary teams of board-certified psychiatrists  Nurse practitioners and physician assistants  Licensed social workers  Psychiatric nurses   Other mental health professionals  [facility] Partial Hospitalization Program consists of a variety of group and individual sessions that focus on support, education and recovery. Patients and their families leave the treatment experience with skills and tested strategies to continue their recovery and prevent relapse. Medication assessment and management also is an integral part of treatment. [facility]  Phone Number: [phone]  In Person Location: [address] [place] [address], [address] [place] [address], [address] [place] [address]  Also available Virtual via [employer]  *Tentative Schedule  8 am - 9 am   Arrival Paperwork   9 am - 9:30 am Morning Assessment Group   9:30 am - 10:30 am Psychotherapy Group   10:30 am - 10:45 am Self-Care Break   10:45 am - 11:45 am Education Group   11:45 am -12:30 pm Lunch   12:30 pm - 1:30 pm Music Therapy   1:30 pm - 2 pm Goal Setting   2 pm - 4 pm Case Management Meeting;  Family Meetings     Please note on your first day you are asked to arrive early to complete paperwork. We ask on a daily basis please be prompt for the day and groups.  attempt to touch base with you during self-care and lunch breaks versus group times. Family meetings and additional case management needs can be addressed between 2 pm and 4 pm. Group sessions may vary in time depending on staff and programming needs.

## 2023-07-28 NOTE — NURSING NOTE
Pt pleasant and cooperative throughout the shift. Attended some groups this evening. Adjusting well, social with some peers. Denies SI/HI/AVH.

## 2023-07-29 LAB — TREPONEMA PALLIDUM IGG+IGM AB [PRESENCE] IN SERUM OR PLASMA BY IMMUNOASSAY: NORMAL

## 2023-07-29 PROCEDURE — 99232 SBSQ HOSP IP/OBS MODERATE 35: CPT | Performed by: STUDENT IN AN ORGANIZED HEALTH CARE EDUCATION/TRAINING PROGRAM

## 2023-07-29 NOTE — PROGRESS NOTES
Progress Note - Behavioral Health   [place] Rashid 20 y.o. male MRN: [id number]  Unit/Bed#: [facility] 206-02 Encounter: [id number]    Assessment/Plan   Principal Problem:    Mood disorder ([address])  Active Problems:    Tobacco use disorder    Mixed hyperlipidemia    Medical clearance for psychiatric admission    Wart of hand      Subjective: Patient was seen, chart was reviewed, and case was discussed with the team. Patient reports that his seroquel was discontinued and focused on possible withdrawal and psychosis. He denies any psychosis currently. Reports mild irritability. Denies any other symptoms. He I participating in groups. He did not require any PRN medications.    Behavior over the last 24 hours:  unchanged  Sleep: normal  Appetite: normal  Medication side effects: No    Medical ROS: Pertinent items are noted in HPI.all other systems are negative    Current Medications:  Current Facility-Administered Medications   Medication Dose Route Frequency   • acetaminophen (TYLENOL) tablet 650 mg  650 mg Oral Q6H PRN   • acetaminophen (TYLENOL) tablet 650 mg  650 mg Oral Q4H PRN   • acetaminophen (TYLENOL) tablet 975 mg  975 mg Oral Q6H PRN   • aluminum-magnesium hydroxide-simethicone (MAALOX) oral suspension 30 mL  30 mL Oral Q4H PRN   • haloperidol lactate (HALDOL) injection 2.5 mg  2.5 mg Intramuscular Q4H PRN Max 4/day    And   • LORazepam (ATIVAN) injection 1 mg  1 mg Intramuscular Q4H PRN Max 4/day    And   • benztropine (COGENTIN) injection 0.5 mg  0.5 mg Intramuscular Q4H PRN Max 4/day   • haloperidol lactate (HALDOL) injection 5 mg  5 mg Intramuscular Q4H PRN Max 4/day    And   • LORazepam (ATIVAN) injection 2 mg  2 mg Intramuscular Q4H PRN Max 4/day    And   • benztropine (COGENTIN) injection 1 mg  1 mg Intramuscular Q4H PRN Max 4/day   • benztropine (COGENTIN) tablet 1 mg  1 mg Oral Q4H PRN Max 6/day   • bisacodyl (DULCOLAX) rectal suppository 10 mg  10 mg Rectal Daily PRN   • hydrOXYzine HCL (ATARAX) tablet 50 mg 50 mg Oral Q6H PRN Max 4/day    Or   • diphenhydrAMINE (BENADRYL) injection 50 mg  50 mg Intramuscular Q6H PRN   • haloperidol (HALDOL) tablet 1 mg  1 mg Oral Q6H PRN   • haloperidol (HALDOL) tablet 2.5 mg  2.5 mg Oral Q4H PRN Max 4/day   • haloperidol (HALDOL) tablet 5 mg  5 mg Oral Q4H PRN Max 4/day   • hydrOXYzine HCL (ATARAX) tablet 100 mg  100 mg Oral Q6H PRN Max 4/day    Or   • LORazepam (ATIVAN) injection 2 mg  2 mg Intramuscular Q6H PRN   • hydrOXYzine HCL (ATARAX) tablet 25 mg  25 mg Oral Q6H PRN Max 4/day   • nicotine polacrilex (NICORETTE) gum 4 mg  4 mg Oral Q2H PRN   • polyethylene glycol (MIRALAX) packet 17 g  17 g Oral Daily PRN   • senna-docusate sodium (SENOKOT S) 8.6-50 mg per tablet 1 tablet  1 tablet Oral Daily PRN   • traZODone (DESYREL) tablet 50 mg  50 mg Oral HS PRN       Behavioral Health Medications:   all current active meds have been reviewed. Vitals:  Vitals:    07/28/23 1524   BP: 139/93   Pulse: 60   Resp: 18   Temp: 97.5 °F (36.4 °C)   SpO2: 99%       Laboratory results:    I have personally reviewed all pertinent laboratory/tests results. Mental Status Evaluation:    Appearance:  age appropriate   Behavior:  restless and fidgety   Speech:  normal pitch and normal volume   Mood:  anxious   Affect:  mood-congruent   Thought Process:  goal directed and logical   Thought Content:  normal   Perceptual Disturbances: None   Risk Potential: Suicidal Ideations none  Homicidal Ideations none  Potential for Aggression No   Sensorium:  person, place, time/date and situation   Memory:  recent and remote memory grossly intact   Consciousness:  alert and awake    Attention: attention span appeared shorter than expected for age   Insight:  limited   Judgment: limited   Gait/Station: normal gait/station   Motor Activity: no abnormal movements       Progress Toward Goals: Progressing    Recommended Treatment: Continue with pharmacotherapy, group therapy, milieu therapy and occupational therapy. Risks, benefits and possible side effects of Medications:   Risks, benefits, alternatives, and possible side effects of patient's psychiatric medications were discussed with patient. General: Patient is in no distress and resting comfortably.  HEENT: Moist mucous membranes and no congestion. Left ear: tympanic membrane mild bulging and erythematous; tonsils are not erythematous or enlarged  Neck: Supple with no cervical lymphadenopathy.  Cardiac: Regular rate, with no murmurs, rubs, or gallops.  Pulm: Clear to auscultation bilaterally, with no crackles or wheezes.   Abd: + Bowel sounds. Soft nontender abdomen.  Ext: Mild tenderness to palpation of b/l tibia, r>l; strength 5/5 throughout   Skin: Skin is warm and dry with no rash.  Neuro: No focal deficits.

## 2023-07-29 NOTE — NURSING NOTE
At start of shift, approximately 1530, pt standing at nurse's station asking for CM. Discussed CM role and availability. Pt making bizarre and paranoid statements, difficult to follow. Made statements about what to expect while IP, pt states, "now I know why people are here.", Pt appeared anxious and paranoid. Following dinner, pt appears more relaxed, visible and talkative with staff. Currently taking artificial nails off in front of staff and disposing of at nurse's station for safety on unit. Denies SI, HI, AVH.

## 2023-07-29 NOTE — NURSING NOTE
Pt signed 72hr request 7/29/23 at 1800. Process was explained to pt at this time, pt verbally acknowledged information.

## 2023-07-29 NOTE — NURSING NOTE
Patient is visible in the milieu, pleasant on approach. Denies SI HI and hallucinations. Attending groups. Feels improved. Does express some mild anxiety explaining wanting to be off medications then worrying he was taken off too quickly. Encouraged patient to speak with staff if having any side effects, currently denies. Denies other questions or concerns. Staff availability reinforced.

## 2023-07-29 NOTE — PLAN OF CARE
Problem: DEPRESSION  Goal: Will be euthymic at discharge  Description: INTERVENTIONS:  - Administer medication as ordered  - Provide emotional support via 1:1 interaction with staff  - Encourage involvement in milieu/groups/activities  - Monitor for social isolation  Outcome: Progressing     Problem: ANXIETY  Goal: Will report anxiety at manageable levels  Description: INTERVENTIONS:  - Administer medication as ordered  - Teach and encourage coping skills  - Provide emotional support  - Assess patient/family for anxiety and ability to cope  Outcome: Progressing  Goal: By discharge: Patient will verbalize 2 strategies to deal with anxiety  Description: Interventions:  - Identify any obvious source/trigger to anxiety  - Staff will assist patient in applying identified coping technique/skills  - Encourage attendance of scheduled groups and activities  Outcome: Progressing     Problem: SLEEP DISTURBANCE  Goal: Will exhibit normal sleeping pattern  Description: Interventions:  -  Assess the patients sleep pattern, noting recent changes  - Administer medication as ordered  - Decrease environmental stimuli, including noise, as appropriate during the night  - Encourage the patient to actively participate in unit groups and or exercise during the day to enhance ability to achieve adequate sleep at night  - Assess the patient, in the morning, encouraging a description of sleep experience  Outcome: Progressing     Problem: DISCHARGE PLANNING  Goal: Discharge to home or other facility with appropriate resources  Description: INTERVENTIONS:  - Identify barriers to discharge w/patient and caregiver  - Arrange for needed discharge resources and transportation as appropriate  - Identify discharge learning needs (meds, wound care, etc.)  - Arrange for interpretive services to assist at discharge as needed  - Refer to Case Management Department for coordinating discharge planning if the patient needs post-hospital services based on physician/advanced practitioner order or complex needs related to functional status, cognitive ability, or social support system  Outcome: Progressing     Problem: Ineffective Coping  Goal: Participates in unit activities  Description: Interventions:  - Provide therapeutic environment   - Provide required programming   - Redirect inappropriate behaviors   Outcome: Progressing

## 2023-07-29 NOTE — NURSING NOTE
Pt is awake, alert, pleasant during interactions with peers and staff. Provided with toiletries, pt showered and brushed teeth this morning. Reports some disturbed sleep, but overall slept last night. Discussed discontinuation of Seroquel, pt initially concerned with stopping abruptly, no side effects or symptoms present. Pt has been visible and social, playing cards and attending groups. Denies SI, HI, AVH. At times appears anxious.

## 2023-07-30 VITALS
SYSTOLIC BLOOD PRESSURE: 128 MMHG | TEMPERATURE: 98.3 F | BODY MASS INDEX: 32.33 KG/M2 | DIASTOLIC BLOOD PRESSURE: 93 MMHG | HEART RATE: 86 BPM | OXYGEN SATURATION: 98 % | WEIGHT: 206 LBS | RESPIRATION RATE: 18 BRPM | HEIGHT: 67 IN

## 2023-07-30 PROCEDURE — 99232 SBSQ HOSP IP/OBS MODERATE 35: CPT | Performed by: STUDENT IN AN ORGANIZED HEALTH CARE EDUCATION/TRAINING PROGRAM

## 2023-07-30 RX ORDER — QUETIAPINE FUMARATE 200 MG/1
200 TABLET, FILM COATED ORAL
Status: DISCONTINUED | OUTPATIENT
Start: 2023-07-30 | End: 2023-07-31 | Stop reason: HOSPADM

## 2023-07-30 RX ADMIN — HYDROXYZINE HYDROCHLORIDE 100 MG: 50 TABLET, FILM COATED ORAL at 00:20

## 2023-07-30 NOTE — NURSING NOTE
F/U to Atarax administration at 0020 hrs for severe anxiety, Patient appears to be asleep in bed, no difficulty observed.

## 2023-07-30 NOTE — NURSING NOTE
Patient appeared to have slept the remainder of shift after Atarax administration at 0020 hrs for anxiety. All safety precautions maintained.

## 2023-07-30 NOTE — NURSING NOTE
T approached pt. Asked if I could take vital signs, pt stated to T. "if you have to,"  T asked how Pts day was, Pt stated "I don't want to talk to you, you work for [employer], and I don't trust anyone here". Pt also stated that they did not feel safe. This T asked "why don't you feel safe?"  They stated "I trust no one here, and I don't need to be a part of this experiment". This T was able to obtain vital signs.

## 2023-07-30 NOTE — NURSING NOTE
Patient came out of room at start of shift and appears suspicious of staff, asking [facility] why staff are looking at patient. Reviewed staff rounding and patient smiled closing the door. Short while later patient expressed anger and frustration. Bipin urbano told patient it would be like this here, could not specify statement when asked. Patient appearing paranoid of this writer when saying " this place is designed for me, its about me, you all wait until I fall asleep then will come in and drug me or move me to a new unit, well do it to my face!" assured patient no one is coming in patient's room and staff are here to maintain patient safety. Reviewed rounding. Offered PRN medication if needed. Patient insisting and requesting staff tell patient the truth. Again reassured patient all staff are here to help support patient and keep patient safe. Requested and given ice water. walked in hallway and spoke with this writer about being taken off medications and now doesn't believe it was a good idea, also upset saying never diagnosed with bipolar and another hospital is lying. Encouraged patient to speak with doctor regarding medications. Expressed paranoia regarding roommate saying roommate is wearing all his clothes to go to bed, asked why is he doing that unless something is up? Patient then asked for PRN medication for severe anxiety. Same offered and RN allowed patient to see medication in package prior to being opened as patient expressing concern that medication will be switched or will be given something other than what was discussed. patient thanked staff and returned to room. Currently in bed resting quietly.

## 2023-07-30 NOTE — TREATMENT TEAM
07/28/23 1330   Activity/Group Checklist   Group Other (Comment)  (collage making- vision boards)   Attendance Attended   Attendance Duration (min) 46-60   Interactions Interacted appropriately   Affect/Mood Appropriate;Calm   Goals Achieved Able to listen to others; Able to engage in interactions; Other (Comment)  (utilized collage materials appropriately)
07/29/23 0800   Team Meeting   Meeting Type Daily Rounds   Team Members Present   Team Members Present Physician;Nurse   Physician Team Member [id number]  [facility]   Nursing Team Member Twin   Patient/Family Present   Patient Present No   Patient's Family Present No       AM Rounds:
07/30/23 0800   Team Meeting   Meeting Type Daily Rounds   Team Members Present   Team Members Present Physician;Nurse   Physician Team Member [id number]  [facility]   Nursing Team Member Harinder   Patient/Family Present   Patient Present No   Patient's Family Present No       AM rounds- denies SI/HI, feels like he wants to restart medication. Overnight was agitated, paranoid. Yelling at staff, demanding. Received prn atarax which was effective and slept remainder of night.
Statement Selected

## 2023-07-30 NOTE — PROGRESS NOTES
Progress Note - Behavioral Health   Polina Bear 20 y.o. male MRN: [id number]  Unit/Bed#: [facility]  282-71 Encounter: [id number]    Danya Leo was seen for follow-up, chart reviewed and discussed with nursing staff. Nursing staff notes he signed a 72-hour notice at 1800 on 7/29. Has been increasingly paranoid and accused staff of trying to drug him during the night while he slept. Staff notes he was irritable last night, agitated with staff during rounds. Received as needed hydroxyzine with positive affect and slept. Appetite is adequate. No physical aggression. Patient was seen this morning in his room. He appeared irritable and paranoid on approach stating "if you are not going to tell the truth you can just leave" even before I introduced myself. Making comments that this was "an experimental unit" and talked about pictures on the Internet. Argumentative, guarded, and paranoid with questions. Denies suicidal or homicidal ideation. Denies auditory or visual hallucinations. Disorganized thought process. Making contradictory statements reporting that coming off the medication was a "mistake" although he is uncertain if he wants to resume medication. Discussed medication and restarting Seroquel and discussed resuming 200 mg at bedtime. He had been on 400 mg prior to admission. Physically he denies any pain or discomfort.     Behavior over the last 24 hours:  unchanged  Sleep: normal  Appetite: normal  Medication side effects: No  ROS: no complaints  /82 (BP Location: Left arm)   Pulse 70   Temp 98.3 °F (36.8 °C) (Tympanic)   Resp 16   Ht 5' 7" (1.702 m)   Wt 93.4 kg (206 lb)   SpO2 98%   BMI 32.26 kg/m²     Medications:   Current Facility-Administered Medications   Medication Dose Route Frequency   • acetaminophen (TYLENOL) tablet 650 mg  650 mg Oral Q6H PRN   • acetaminophen (TYLENOL) tablet 650 mg  650 mg Oral Q4H PRN   • acetaminophen (TYLENOL) tablet 975 mg  975 mg Oral Q6H PRN   • aluminum-magnesium hydroxide-simethicone (MAALOX) oral suspension 30 mL  30 mL Oral Q4H PRN   • haloperidol lactate (HALDOL) injection 2.5 mg  2.5 mg Intramuscular Q4H PRN Max 4/day    And   • LORazepam (ATIVAN) injection 1 mg  1 mg Intramuscular Q4H PRN Max 4/day    And   • benztropine (COGENTIN) injection 0.5 mg  0.5 mg Intramuscular Q4H PRN Max 4/day   • haloperidol lactate (HALDOL) injection 5 mg  5 mg Intramuscular Q4H PRN Max 4/day    And   • LORazepam (ATIVAN) injection 2 mg  2 mg Intramuscular Q4H PRN Max 4/day    And   • benztropine (COGENTIN) injection 1 mg  1 mg Intramuscular Q4H PRN Max 4/day   • benztropine (COGENTIN) tablet 1 mg  1 mg Oral Q4H PRN Max 6/day   • bisacodyl (DULCOLAX) rectal suppository 10 mg  10 mg Rectal Daily PRN   • hydrOXYzine HCL (ATARAX) tablet 50 mg  50 mg Oral Q6H PRN Max 4/day    Or   • diphenhydrAMINE (BENADRYL) injection 50 mg  50 mg Intramuscular Q6H PRN   • haloperidol (HALDOL) tablet 1 mg  1 mg Oral Q6H PRN   • haloperidol (HALDOL) tablet 2.5 mg  2.5 mg Oral Q4H PRN Max 4/day   • haloperidol (HALDOL) tablet 5 mg  5 mg Oral Q4H PRN Max 4/day   • hydrOXYzine HCL (ATARAX) tablet 100 mg  100 mg Oral Q6H PRN Max 4/day    Or   • LORazepam (ATIVAN) injection 2 mg  2 mg Intramuscular Q6H PRN   • hydrOXYzine HCL (ATARAX) tablet 25 mg  25 mg Oral Q6H PRN Max 4/day   • nicotine polacrilex (NICORETTE) gum 4 mg  4 mg Oral Q2H PRN   • polyethylene glycol (MIRALAX) packet 17 g  17 g Oral Daily PRN   • QUEtiapine (SEROquel) tablet 200 mg  200 mg Oral HS   • senna-docusate sodium (SENOKOT S) 8.6-50 mg per tablet 1 tablet  1 tablet Oral Daily PRN   • traZODone (DESYREL) tablet 50 mg  50 mg Oral HS PRN       Labs:   Admission on 07/27/2023   Component Date Value Ref Range Status   • WBC 07/28/2023 4.81  4.31 - 10.16 Thousand/uL Final   • RBC 07/28/2023 5.38  3.88 - 5.62 Million/uL Final   • Hemoglobin 07/28/2023 16.0  12.0 - 17.0 g/dL Final   • Hematocrit 07/28/2023 47.3  36.5 - 49.3 % Final • MCV 07/28/2023 88  82 - 98 fL Final   • MCH 07/28/2023 29.7  26.8 - 34.3 pg Final   • MCHC 07/28/2023 33.8  31.4 - 37.4 g/dL Final   • RDW 07/28/2023 13.1  11.6 - 15.1 % Final   • MPV 07/28/2023 10.2  8.9 - 12.7 fL Final   • Platelets 09/34/5975 238  149 - 390 Thousands/uL Final   • nRBC 07/28/2023 0  /100 WBCs Final   • Neutrophils Relative 07/28/2023 36 (L)  43 - 75 % Final   • Immat GRANS % 07/28/2023 0  0 - 2 % Final   • Lymphocytes Relative 07/28/2023 52 (H)  14 - 44 % Final   • Monocytes Relative 07/28/2023 9  4 - 12 % Final   • Eosinophils Relative 07/28/2023 2  0 - 6 % Final   • Basophils Relative 07/28/2023 1  0 - 1 % Final   • Neutrophils Absolute 07/28/2023 1.74 (L)  1.85 - 7.62 Thousands/µL Final   • Immature Grans Absolute 07/28/2023 0.00  0.00 - 0.20 Thousand/uL Final   • Lymphocytes Absolute 07/28/2023 2.53  0.60 - 4.47 Thousands/µL Final   • Monocytes Absolute 07/28/2023 0.43  0.17 - 1.22 Thousand/µL Final   • Eosinophils Absolute 07/28/2023 0.08  0.00 - 0.61 Thousand/µL Final   • Basophils Absolute 07/28/2023 0.03  0.00 - 0.10 Thousands/µL Final   • Sodium 07/28/2023 139  135 - 147 mmol/L Final   • Potassium 07/28/2023 3.6  3.5 - 5.3 mmol/L Final   • Chloride 07/28/2023 104  96 - 108 mmol/L Final   • CO2 07/28/2023 25  21 - 32 mmol/L Final   • ANION GAP 07/28/2023 10  mmol/L Final   • BUN 07/28/2023 16  5 - 25 mg/dL Final   • Creatinine 07/28/2023 0.91  0.60 - 1.30 mg/dL Final   • Glucose 07/28/2023 80  65 - 140 mg/dL Final   • Glucose, Fasting 07/28/2023 80  65 - 99 mg/dL Final   • Calcium 07/28/2023 9.9  8.4 - 10.2 mg/dL Final   • AST 07/28/2023 13  13 - 39 U/L Final   • ALT 07/28/2023 10  7 - 52 U/L Final   • Alkaline Phosphatase 07/28/2023 61  34 - 104 U/L Final   • Total Protein 07/28/2023 7.4  6.4 - 8.4 g/dL Final   • Albumin 07/28/2023 4.9  3.5 - 5.0 g/dL Final   • Total Bilirubin 07/28/2023 0.57  0.20 - 1.00 mg/dL Final   • eGFR 07/28/2023 118  ml/min/1.73sq m Final   • Folate 07/28/2023 9. 5  >5.9 ng/mL Final   • Cholesterol 07/28/2023 187  See Comment mg/dL Final   • Triglycerides 07/28/2023 202 (H)  See Comment mg/dL Final   • HDL, Direct 07/28/2023 27 (L)  >=40 mg/dL Final   • LDL Calculated 07/28/2023 120 (H)  0 - 100 mg/dL Final   • Non-HDL-Chol (CHOL-HDL) 07/28/2023 160  mg/dl Final   • TSH 3RD GENERATON 07/28/2023 1.162  0.450 - 4.500 uIU/mL Final   • Vitamin B-12 07/28/2023 89 (L)  180 - 914 pg/mL Final   • Vit D, 25-Hydroxy 07/28/2023 16.5 (L)  30.0 - 100.0 ng/mL Final   • Hemoglobin A1C 07/28/2023 4.9  Normal 4.0-5.6%; PreDiabetic 5.7-6.4%;  Diabetic >=6.5%; Glycemic control for adults with diabetes <7.0% % Final   • EAG 07/28/2023 94  mg/dl Final   • Syphilis Total Antibody 07/28/2023 Non-reactive  Non-Reactive Final       Mental Status Evaluation:  Appearance:  age appropriate and casually dressed   Behavior:  evasive, guarded and restless and fidgety, argumentative   Speech:  normal pitch and normal volume   Mood:  irritable and labile   Affect:  labile   Thought Process:  disorganized and illogical   Thought Content:     Associations: delusions  persecutory    loose   Perceptual Disturbances: Denies auditory or visual hallucinations   Risk Potential: Suicidal Ideations none, Homicidal Ideations none and Potential for Aggression No   Sensorium:  person, place and time/date   Cognition:  recent and remote memory grossly intact   Consciousness:  alert and awake    Attention: attention span appeared shorter than expected for age   Insight:  limited   Judgment: limited   Gait/Station: normal gait/station   Motor Activity: no abnormal movements     Progress Toward Goals: No improvement    Assessment/Plan   Principal Problem:    Mood disorder (HCC)  Active Problems:    Tobacco use disorder    Mixed hyperlipidemia    Medical clearance for psychiatric admission    Wart of hand      Recommended Treatment:  Start Seroquel 200 mg at bedtime,    Continue with group therapy, milieu therapy and occupational therapy. Risks, benefits and possible side effects of Medications:   Risks, benefits, and possible side effects of medications explained to patient and patient verbalizes understanding. Counseling / Coordination of Care  Total floor / unit time spent today 30 minutes. Greater than 50% of total time was spent with the patient and / or family counseling and / or coordination of care.  A description of the counseling / coordination of care: Medication management, chart review, patient interview

## 2023-07-30 NOTE — NURSING NOTE
Pt denies SI/HI, minimal in conversation. States wanting to rescind 72 hour notice due to "the doctor wants me to because I guess we both planned on changing my meds." pt was provided with 72 hour notice to rescind however pt then declined, stating he wants to think some more before making the decision. Pt appears about however denies, walking halls often. Denies any concerns at this time. States " I want to go home but I think maybe I should stay so I'm just doing some thinking right now." encouraged pt to notify staff with any questions/concerns.

## 2023-07-30 NOTE — NURSING NOTE
Pt approached nurses station, asking to see 72 hour notice. This RN showed pt original signed 72 hour paper. Pt said "Okay, so I'll leave Tuesday". Further information provided. Pt demanding sister have access to pt's chart. Pt explained staff can sign an [place] for sister but unable to give access to [facility] chart. Pt demanding at locker times to have phone. Educated phone use is to write down phone numbers. Pt abruptly stopping conversation and walking away. Pt calling sister. After phone call, pt asking the same questions multiple times. Pt states "I don't want to have to jason. I don't want it to come to that".

## 2023-07-31 PROCEDURE — 99239 HOSP IP/OBS DSCHRG MGMT >30: CPT | Performed by: PSYCHIATRY & NEUROLOGY

## 2023-07-31 RX ORDER — QUETIAPINE FUMARATE 200 MG/1
200 TABLET, FILM COATED ORAL
Qty: 30 TABLET | Refills: 0 | Status: SHIPPED | OUTPATIENT
Start: 2023-07-31 | End: 2023-08-30

## 2023-07-31 NOTE — CASE MANAGEMENT
CM met with pt to check in about dc plans for today. Pt was in his room and willing to speak to CM. CM reviewed with pt dc referral options including [facility], [facility]. Pt is also connected with [facility] and can follow up with them after dc. Pt reported "I want to do the virtual but I also don't want to because my sisters might come pick me up at anytime and I don't want to take a spot from anyone else."     Pt continues to report that he plans to move with his sister in [place] as his apartment lease is coming to an end. Pt also reported he needs to get home to clean and do dishes. Pt reported also being concerned about his medications and not sure if he still has insurance due to quitting his job. Pt reported he will need to look on his phone for GoodRx just in case. CM spoke to pt about not being notified that his insurance has lapsed yet but he also has active Medicaid that would cover his medications. Pt reported he will need a ride home. Pt was going back and forth whether he would like a ride to his apartment or to the pharmacy. Pt then reported he would like to dc to his apartment and then he will get old medications together and then see if his roommate can take him to pharmacy to get his medications. CM provided pt with CM's phone number for him to call after dc if he plans to stay in [place] and would like to be referred to [facility]. Pt verbalized understanding and agreement.

## 2023-07-31 NOTE — PROGRESS NOTES
Attended group alongside other people on the unit, participated in discussion around recovery-centered topic, and contributed their own lived experience toward connection between group members. 07/28/23 1230 07/28/23 1330 07/28/23 1730   Activity/Group Checklist   Group Life Skills Other (Comment)  (collage making- vision boards) Other (Comment)  (5:30 PM Structured Journaling Group)   Attendance Attended  (Left before end of this group, did not return to this group.) Attended Attended   Attendance Duration (min) 31-45 46-60 Greater than 60   Interactions Interacted appropriately Interacted appropriately Interacted appropriately   Affect/Mood Appropriate Appropriate;Calm Appropriate   Goals Achieved Other (Comment)  (Engaged with CPS and peers on the unit by creating cards of encouragement and contributing ideas and solutions to ‘backpacks for the back-end’ initiative for future [facility] peers. Created certificates to celebrate peers’ graduation from unit.) Able to listen to others; Able to engage in interactions; Other (Comment)  (utilized collage materials appropriately) Other (Comment)  (Engaged with CPS and peers on the unit by discovering mutual support within the safe space of the unit. Received and reciprocated empathy of surviving painful experiences.  Invited honesty from other peers about trauma and recent losses.)

## 2023-07-31 NOTE — NURSING NOTE
Met with patient in [facility], initially cooperative and responded appropriately to questions. Denies AVH, HI, SI, admits to anxiety and mild depression. However, was very focussed on being discharged. Patient stated that he thinks he need to focus more on therapy rather than medication, and that he just wants to get home and be with his cat. He further went on to verbalized that this place ( the hospital) is hindering him. Explaining that it is too controlled. This writer asked patient to further clarify what he meant by " the place is controlled". Patient went on to state that he believe it is his right as an American citizen to know how the doctors think about the practice and philosophy of the hospital. He became very guarded in his statements thereafter, and then begun making incomplete statements and asked this writer "do you know what I mean?". He later became irritable stating I don't understand him and that am twisting his words and walked away.

## 2023-07-31 NOTE — BH TRANSITION RECORD
Contact Information: If you have any questions, concerns, pended studies, tests and/or procedures, or emergencies regarding your inpatient behavioral health visit. Please contact [place] [facility] [facility] [phone] and ask to speak to a , nurse or physician. A contact is available 24 hours/ 7 days a week at this number. Summary of Procedures Performed During your Stay:  Below is a list of major procedures performed during your hospital stay and a summary of results:  - No major procedures performed. Pending Studies (From admission, onward)    None        Please follow up on the above pending studies with your PCP and/or referring provider.

## 2023-07-31 NOTE — NURSING NOTE
Pt expressed readiness for discharge. AVS reviewed with pt. Denies any question or concerns. Pt discharge from unit via [address] [place] [address].

## 2023-07-31 NOTE — NURSING NOTE
Pt observed yelling in the hallway. Demanding to speak with a provider. Educated providers and case management will meet with pt after treatment team meeting. Pt expressed not trusting staff. Calling staff liars. Calling sister on the phone stating "this is not a real psychiatric unit. They are refusing to let me leave". Focused on exit sign not being "accurate". Pt needed multiple verbal redirection by this writer and other staff members. Security present on the unit. Pt reports "You're focusing on what I did as a child". Pt has poor insight. Refusing medications. Calling staff the "problem" on unit.

## 2023-07-31 NOTE — DISCHARGE SUMMARY
Discharge Summary - [address] 20 y.o. male MRN: [id number]  Unit/Bed#: [facility] 420-78 Encounter: [id number]     Admission Date: 7/27/2023         Discharge Date: 07/31/23    Attending Psychiatrist: Emelia Gonzalez DO    Reason for Admission/HPI (as per admission documentation):     Per Crisis worker note:  Crisis Intervention Specialist (CIS) met with patient (Pt) and completed intake and safety assessment.  He denies suicidal and homicidal ideation.  Pt also is denying hallucinations. Alejandra Sarabia does admit to increase in anxiety attacks and stated that his anxiety includes racing thoughts that is making it difficult for him to work and concentrate on tasks.  Pt also stated that he has had memories lately reminding him of past trauma events.  Pt would not elaborate or disclose what the trauma consists of in past.  Pt stated that he sees a psychiatrist every 3 months at [facility] and has been taking Seroquel for some time daily. Alejandra Sarabia stated that he just saw his psychiatrist who prescribed him Lamictil and he took it for 2 days however he stopped and stated that he did not feel right.  Pt stated that he thought about suicide in 2018 after his mother passed but did not attempt.  Pt stated that he recently quit his job at [employer] as he could not focus on work and admits to [employer] of others including his roomates.  Pt has a cat at home who he stated one of his roomates will care for while he is gettign inpatient treatment.  Pt is seeking inpatient treatment as he cannot function at a job or at home at this time.  Pt was hospitalized in 2018 for suicidal ideation at [facility] and explained 201 to Pt as well as 201 handout.  Pt and ED Dr signed 201.      On admission to Inpatient Psychiatric Unit:  Patient is a 79-year-old biological male with an unclear gender identity, and a past psychiatric history stated as bipolar disorder which of note the patient doubts, who presents voluntarily to inpatient U with paranoia. The patient's symptoms started several weeks ago and have been progressively worsening. The exacerbating stressors are recently having memories of prior traumatic events which the patient refuses to describe. There are no mitigating factors present. The patient's symptoms include paranoia. The patient reports that he was diagnosed with bipolar 1 disorder but then noticed when reading his chart online that the diagnosis was unclear. The patient then began to think that he did not need to take Seroquel and Lamictal if his diagnosis was not correct. The patient called the emergency room asking if he could be taken off of his medications in the emergency room told him reportedly that they do not do that, so the patient came for psychiatric hospitalization. On my exam, there is no evidence of psychosis or maryam. The patient is a bit vague and cryptic in his requests but states that he wants to be off medication so that he can determine if he needs medication and what his true diagnosis is. We discussed plan to do a drug holiday this weekend and then reassess on Monday morning to determine if medication management will be necessary. At this time, the patient denies suicidal thoughts or behaviors. He denies depressive or manic symptomatology. He denies psychotic symptoms other than feeling paranoid. He does have 1 prior hospitalization at which time he was feeling suicidal. He was previously taking Seroquel and Lamictal and seeing a Psychiatrist in [place] ([place] every few months. Past Medical History:   Diagnosis Date   • Anxiety    • Bipolar disorder ([address])      No past surgical history on file.     Medications:    Current Facility-Administered Medications   Medication Dose Route Frequency Provider Last Rate   • acetaminophen  650 mg Oral Q6H PRN Essence Lin MD     • acetaminophen  650 mg Oral Q4H PRN Essence Lin MD     • acetaminophen  975 mg Oral Q6H PRN Kitty Fabry, MD     • aluminum-magnesium hydroxide-simethicone  30 mL Oral Q4H PRN Kitty Fabry, MD     • haloperidol lactate  2.5 mg Intramuscular Q4H PRN Max 4/day Kitty Fabry, MD      And   • LORazepam  1 mg Intramuscular Q4H PRN Max 4/day Kitty Fabry, MD      And   • benztropine  0.5 mg Intramuscular Q4H PRN Max 4/day Kitty Fabry, MD     • haloperidol lactate  5 mg Intramuscular Q4H PRN Max 4/day Kitty Fabry, MD      And   • LORazepam  2 mg Intramuscular Q4H PRN Max 4/day Kitty Fabry, MD      And   • benztropine  1 mg Intramuscular Q4H PRN Max 4/day Kitty Fabry, MD     • benztropine  1 mg Oral Q4H PRN Max 6/day Kitty Fabry, MD     • bisacodyl  10 mg Rectal Daily PRN Hero Epstein MD     • hydrOXYzine HCL  50 mg Oral Q6H PRN Max 4/day Kitty Fabry, MD      Or   • diphenhydrAMINE  50 mg Intramuscular Q6H PRN Kitty Fabry, MD     • haloperidol  1 mg Oral Q6H PRN Kitty Fabry, MD     • haloperidol  2.5 mg Oral Q4H PRN Max 4/day Kitty Fabry, MD     • haloperidol  5 mg Oral Q4H PRN Max 4/day Kitty Fabry, MD     • hydrOXYzine HCL  100 mg Oral Q6H PRN Max 4/day Kitty Fabry, MD      Or   • LORazepam  2 mg Intramuscular Q6H PRN Kitty Fabry, MD     • hydrOXYzine HCL  25 mg Oral Q6H PRN Max 4/day Kitty Fabry, MD     • nicotine polacrilex  4 mg Oral Q2H PRN Kitty Fabry, MD     • polyethylene glycol  17 g Oral Daily PRN Hero Epstein MD     • QUEtiapine  200 mg Oral HS Leidy Valerio PA-C     • senna-docusate sodium  1 tablet Oral Daily PRN Kitty Fabry, MD     • traZODone  50 mg Oral HS PRN Kitty Fabry, MD         Allergies:      Allergies   Allergen Reactions   • Penicillins Vomiting   • Pollen Extract Hives       Objective     Vital signs in last 24 hours:    Temp:  [98.3 °F (36.8 °C)] 98.3 °F (36.8 °C)  HR:  [86] 86  Resp:  [18] 18  BP: (128)/(93) 128/93    No intake or output data in the 24 hours ending 07/31/23 [facility]. During their hospitalization, Lloyd De Guzman was encouraged to attend groups and interact appropriately and positively with others in the milieu. Lloyd De Guzman initially requested a drug holiday under Psychiatric supervision to determine if psychotropics were necessary. He was then started on Seroquel 200mg PO HS over the weekend, but refused to take that medication dose. Patient displayed some paranoia consistent with cluster B personality pathology, and was suspicious of staff and milieu, but did not engage in any acts of violence, aggression, or self harm or harm to others. Alex then requested discharge home. Lloyd De Guzman stated they did not intend to engage in psychiatric treatment if he stayed here longer. At the time of discharge, there were no criteria present through which Lloyd De Guzman could be kept involuntarily for further psychiatric hospitalization. An outpatient discharge/follow up plan was discussed and coordinated between Lloyd De Guzman, this writer, and case management team.    Specific discharge disposition: Home. Mental Status at Time of Discharge:     Appearance: casually dressed, appears consistent with stated age  Motor: no psychomotor disturbances, no gait abnormalities  Behavior: superficailly cooperative, interacts with this writer appropriately but is focused on discharge  Speech: normal rate, rhythm, and volume  Mood: "good"  Affect: irritable  Thought Process: organized, linear, and goal-oriented  Thought Content: denies auditory or visual hallucinations  Perception: denies delusions or other perceptual disturbances  Risk Potential: denies suicidal ideation, plan, or intent.  Denies homicidal ideation  Sensorium: Oriented to person, place, time, and situation  Cognition: cognitive ability appears intact but was not quantitatively tested  Consciousness: alert and awake  Attention: able to focus without difficulty  Insight: fair  Judgement: limited      Suicide/Homicide Risk Assessment:    Risk of Harm to Self:   • Patient denied suicidal thoughts, intent, plan, or acts of furtherance at the time of discharge. Risk of Harm to Others:  • Patient denied homicidal thoughts or intent at the time of discharge      Admission Diagnosis:    Principal Problem:    Mood disorder ([address] [facility])  Active Problems:    Tobacco use disorder    Mixed hyperlipidemia    Medical clearance for psychiatric admission    Wart of hand      Discharge Diagnosis:     Principal Problem:    Mood disorder ([address] [facility])  Active Problems:    Tobacco use disorder    Mixed hyperlipidemia    Medical clearance for psychiatric admission    Wart of hand  Resolved Problems:    * No resolved hospital problems. *      Laboratory Results: I have personally reviewed all pertinent lab results. No results found for this or any previous visit (from the past 48 hour(s)). Discharge Medications:    See after visit summary for all reconciled discharge medications provided to patient and family.       Current Discharge Medication List         Current Discharge Medication List      STOP taking these medications       hydrOXYzine HCL (ATARAX) 10 mg tablet Comments:   Reason for Stopping:         hydrOXYzine HCL (ATARAX) 50 mg tablet Comments:   Reason for Stopping:         methylprednisolone (MEDROL) 4 mg tablet Comments:   Reason for Stopping:         triamcinolone (KENALOG) 0.1 % cream Comments:   Reason for Stopping:              Current Discharge Medication List      CONTINUE these medications which have CHANGED    Details   QUEtiapine (SEROquel) 200 mg tablet Take 1 tablet (200 mg total) by mouth daily at bedtime  Qty: 30 tablet, Refills: 0    Associated Diagnoses: Mood disorder [facility])            Current Discharge Medication List           Discharge instructions/Information to patient and family:     See after visit summary for information provided to patient and family. Provisions for Follow-Up Care:    See after visit summary for information related to follow-up care and any pertinent home health orders. Discharge Statement:    I spent 32 minutes discharging the patient. This time was spent on the day of discharge. I had direct contact with the patient on the day of discharge. Additional documentation is required if more than 30 minutes were spent on discharge:    · I had face-to-face contact with the patient and discussed discharge treatment plan  · I reviewed the importance of compliance with medications and outpatient treatment after discharge with the patient. · I discussed discharge and treatment plan with the patient, nursing, and case management/social work. · I discussed the medication regimen and possible side effects of the medications with the patient prior to discharge. At the time of discharge they were tolerating psychiatric medications. · I discussed outpatient follow up with the patient as per treatment plan / aftercare summary. · I reviewed elements of the aftercare plan with the patient. I discussed that if symptoms worsen or reoccur, that the patient can return to the emergency room or dial 911 in an emergency. · I reviewed the patient's hospital course and current psychiatric disease status. As of today, they are now at goal. They are psychiatrically stable for discharge home. The combination of active psychopharmacological medication management, interdisciplinary coordination with case management, and adjunctive milieu and group therapy to augment psychopharmacological efficacy appears to have been successful. The patient's risk of morbidity, and progression or decompensation of psychiatric disease, is lower today as compared to the day of admission.       Rancho Alvarez DO 07/31/23

## 2023-07-31 NOTE — PROGRESS NOTES
Pt signed 72 hour notice on 7/29/23 @ 1800. Initially wanted a medication holiday and to stay the weekend to be observed. Pt began making bizarre comments and behavior. Suspicious and paranoid of staff and reported he does not trust staff. Pushing on exit doors, asking for dc. Reported his rights were violated. Demanding. DC: Today (?)      07/31/23 0756   Team Meeting   Meeting Type Daily Rounds   Team Members Present   Team Members Present Physician;Nurse;; Other (Discipline and Name)   Physician Team Member Dr. Ashkan Xiao / Dr. Cyndie Castillo / Shirley Au / Jennifer Alfaro Team Member [facility] Management Team Member Kalpana Brooke / Lily Luis / Farzad Howard   Other (Discipline and Name) Sharlette Dinning - Art Therapy   Patient/Family Present   Patient Present No   Patient's Family Present No